# Patient Record
Sex: MALE | Race: OTHER | HISPANIC OR LATINO | URBAN - METROPOLITAN AREA
[De-identification: names, ages, dates, MRNs, and addresses within clinical notes are randomized per-mention and may not be internally consistent; named-entity substitution may affect disease eponyms.]

---

## 2020-04-01 ENCOUNTER — EMERGENCY (EMERGENCY)
Facility: HOSPITAL | Age: 45
LOS: 1 days | Discharge: HOME | End: 2020-04-01
Attending: EMERGENCY MEDICINE
Payer: MEDICAID

## 2020-04-01 VITALS
HEIGHT: 66 IN | RESPIRATION RATE: 17 BRPM | SYSTOLIC BLOOD PRESSURE: 153 MMHG | HEART RATE: 93 BPM | TEMPERATURE: 98 F | OXYGEN SATURATION: 99 % | DIASTOLIC BLOOD PRESSURE: 90 MMHG | WEIGHT: 145.06 LBS

## 2020-04-01 PROCEDURE — 99284 EMERGENCY DEPT VISIT MOD MDM: CPT

## 2020-04-01 PROCEDURE — 99283 EMERGENCY DEPT VISIT LOW MDM: CPT

## 2020-04-01 RX ORDER — ASPIRIN/CALCIUM CARB/MAGNESIUM 324 MG
1 TABLET ORAL
Qty: 0 | Refills: 0 | DISCHARGE

## 2020-04-01 RX ORDER — INSULIN GLARGINE 100 [IU]/ML
1 INJECTION, SOLUTION SUBCUTANEOUS
Qty: 15 | Refills: 0
Start: 2020-04-01 | End: 2020-04-30

## 2020-04-01 RX ORDER — ACETAMINOPHEN WITH CODEINE 300MG-30MG
1 TABLET ORAL
Qty: 12 | Refills: 0
Start: 2020-04-01 | End: 2020-04-03

## 2020-04-01 RX ORDER — LISINOPRIL 2.5 MG/1
1 TABLET ORAL
Qty: 30 | Refills: 0
Start: 2020-04-01 | End: 2020-04-30

## 2020-04-01 RX ORDER — ASPIRIN/CALCIUM CARB/MAGNESIUM 324 MG
1 TABLET ORAL
Qty: 30 | Refills: 0
Start: 2020-04-01 | End: 2020-04-30

## 2020-04-01 RX ORDER — ALBUTEROL 90 UG/1
2 AEROSOL, METERED ORAL
Qty: 1 | Refills: 0
Start: 2020-04-01 | End: 2020-04-07

## 2020-04-01 RX ORDER — LISINOPRIL 2.5 MG/1
1 TABLET ORAL
Qty: 0 | Refills: 0 | DISCHARGE

## 2020-04-01 RX ORDER — INSULIN GLARGINE 100 [IU]/ML
0 INJECTION, SOLUTION SUBCUTANEOUS
Qty: 0 | Refills: 0 | DISCHARGE

## 2020-04-01 NOTE — ED PROVIDER NOTE - DISCHARGE DATE
pw vomiting x today. no chronic medical history. non toxic appearing. vss. zofran ordered dom Em 01-Apr-2020

## 2020-04-01 NOTE — ED PROVIDER NOTE - PATIENT PORTAL LINK FT
You can access the FollowMyHealth Patient Portal offered by [facility] by registering at the following website: [email or web address]. By joining [employer]’s FollowMyHealth portal, you will also be able to view your health information using other applications (apps) compatible with our system.

## 2022-02-25 ENCOUNTER — INPATIENT (INPATIENT)
Facility: HOSPITAL | Age: 47
LOS: 1 days | DRG: 23 | End: 2022-02-27
Attending: NEUROLOGICAL SURGERY | Admitting: NEUROLOGICAL SURGERY
Payer: MEDICAID

## 2022-02-25 ENCOUNTER — EMERGENCY (EMERGENCY)
Facility: HOSPITAL | Age: 47
LOS: 1 days | Discharge: ANOTHER HOSPITAL | End: 2022-02-25
Attending: STUDENT IN AN ORGANIZED HEALTH CARE EDUCATION/TRAINING PROGRAM
Payer: MEDICAID

## 2022-02-25 VITALS
DIASTOLIC BLOOD PRESSURE: 73 MMHG | RESPIRATION RATE: 22 BRPM | HEART RATE: 107 BPM | SYSTOLIC BLOOD PRESSURE: 185 MMHG | TEMPERATURE: 100 F | OXYGEN SATURATION: 99 %

## 2022-02-25 VITALS
WEIGHT: 142.42 LBS | HEART RATE: 106 BPM | SYSTOLIC BLOOD PRESSURE: 184 MMHG | OXYGEN SATURATION: 99 % | HEIGHT: 66 IN | DIASTOLIC BLOOD PRESSURE: 87 MMHG | TEMPERATURE: 100 F | RESPIRATION RATE: 18 BRPM

## 2022-02-25 DIAGNOSIS — I61.9 NONTRAUMATIC INTRACEREBRAL HEMORRHAGE, UNSPECIFIED: ICD-10-CM

## 2022-02-25 PROBLEM — Z78.9 OTHER SPECIFIED HEALTH STATUS: Chronic | Status: ACTIVE | Noted: 2020-04-23

## 2022-02-25 LAB
A1C WITH ESTIMATED AVERAGE GLUCOSE RESULT: 10.4 % — HIGH (ref 4–5.6)
ALBUMIN SERPL ELPH-MCNC: 4 G/DL — SIGNIFICANT CHANGE UP (ref 3.5–5)
ALBUMIN SERPL ELPH-MCNC: 4.9 G/DL — SIGNIFICANT CHANGE UP (ref 3.3–5)
ALP SERPL-CCNC: 404 U/L — HIGH (ref 40–120)
ALP SERPL-CCNC: 405 U/L — HIGH (ref 40–120)
ALT FLD-CCNC: 147 U/L — HIGH (ref 10–45)
ALT FLD-CCNC: 184 U/L DA — HIGH (ref 10–60)
AMPHET UR-MCNC: NEGATIVE — SIGNIFICANT CHANGE UP
ANION GAP SERPL CALC-SCNC: 12 MMOL/L — SIGNIFICANT CHANGE UP (ref 5–17)
ANION GAP SERPL CALC-SCNC: 20 MMOL/L — HIGH (ref 5–17)
ANION GAP SERPL CALC-SCNC: 22 MMOL/L — HIGH (ref 5–17)
ANION GAP SERPL CALC-SCNC: 28 MMOL/L — HIGH (ref 5–17)
APAP SERPL-MCNC: <15 UG/ML — SIGNIFICANT CHANGE UP (ref 10–30)
APPEARANCE UR: CLEAR — SIGNIFICANT CHANGE UP
APTT BLD: 29.2 SEC — SIGNIFICANT CHANGE UP (ref 27.5–35.5)
APTT BLD: 31 SEC — SIGNIFICANT CHANGE UP (ref 27.5–35.5)
APTT BLD: 31.3 SEC — SIGNIFICANT CHANGE UP (ref 27.5–35.5)
AST SERPL-CCNC: 109 U/L — HIGH (ref 10–40)
AST SERPL-CCNC: 125 U/L — HIGH (ref 10–40)
B-OH-BUTYR SERPL-SCNC: 6.8 MMOL/L — HIGH
BACTERIA # UR AUTO: NEGATIVE — SIGNIFICANT CHANGE UP
BARBITURATES UR SCN-MCNC: NEGATIVE — SIGNIFICANT CHANGE UP
BASE EXCESS BLDV CALC-SCNC: -7.4 MMOL/L — SIGNIFICANT CHANGE UP
BASOPHILS # BLD AUTO: 0.04 K/UL — SIGNIFICANT CHANGE UP (ref 0–0.2)
BASOPHILS # BLD AUTO: 0.04 K/UL — SIGNIFICANT CHANGE UP (ref 0–0.2)
BASOPHILS NFR BLD AUTO: 0.2 % — SIGNIFICANT CHANGE UP (ref 0–2)
BASOPHILS NFR BLD AUTO: 0.3 % — SIGNIFICANT CHANGE UP (ref 0–2)
BENZODIAZ UR-MCNC: POSITIVE
BILIRUB SERPL-MCNC: 5.8 MG/DL — HIGH (ref 0.2–1.2)
BILIRUB SERPL-MCNC: 5.8 MG/DL — HIGH (ref 0.2–1.2)
BILIRUB UR-MCNC: NEGATIVE — SIGNIFICANT CHANGE UP
BLD GP AB SCN SERPL QL: NEGATIVE — SIGNIFICANT CHANGE UP
BLD GP AB SCN SERPL QL: SIGNIFICANT CHANGE UP
BUN SERPL-MCNC: 10 MG/DL — SIGNIFICANT CHANGE UP (ref 7–23)
BUN SERPL-MCNC: 11 MG/DL — SIGNIFICANT CHANGE UP (ref 7–18)
BUN SERPL-MCNC: 11 MG/DL — SIGNIFICANT CHANGE UP (ref 7–23)
BUN SERPL-MCNC: 12 MG/DL — SIGNIFICANT CHANGE UP (ref 7–23)
CALCIUM SERPL-MCNC: 10 MG/DL — SIGNIFICANT CHANGE UP (ref 8.4–10.5)
CALCIUM SERPL-MCNC: 8.3 MG/DL — LOW (ref 8.4–10.5)
CALCIUM SERPL-MCNC: 8.7 MG/DL — SIGNIFICANT CHANGE UP (ref 8.4–10.5)
CALCIUM SERPL-MCNC: 9.9 MG/DL — SIGNIFICANT CHANGE UP (ref 8.4–10.5)
CHLORIDE SERPL-SCNC: 107 MMOL/L — SIGNIFICANT CHANGE UP (ref 96–108)
CHLORIDE SERPL-SCNC: 121 MMOL/L — HIGH (ref 96–108)
CHLORIDE SERPL-SCNC: 95 MMOL/L — LOW (ref 96–108)
CHLORIDE SERPL-SCNC: 97 MMOL/L — SIGNIFICANT CHANGE UP (ref 96–108)
CHOLEST SERPL-MCNC: 200 MG/DL — HIGH
CO2 SERPL-SCNC: 14 MMOL/L — LOW (ref 22–31)
CO2 SERPL-SCNC: 17 MMOL/L — LOW (ref 22–31)
CO2 SERPL-SCNC: 17 MMOL/L — LOW (ref 22–31)
CO2 SERPL-SCNC: 20 MMOL/L — LOW (ref 22–31)
COCAINE METAB.OTHER UR-MCNC: NEGATIVE — SIGNIFICANT CHANGE UP
COLOR SPEC: SIGNIFICANT CHANGE UP
CREAT SERPL-MCNC: 0.35 MG/DL — LOW (ref 0.5–1.3)
CREAT SERPL-MCNC: 0.41 MG/DL — LOW (ref 0.5–1.3)
CREAT SERPL-MCNC: 0.44 MG/DL — LOW (ref 0.5–1.3)
CREAT SERPL-MCNC: 0.88 MG/DL — SIGNIFICANT CHANGE UP (ref 0.5–1.3)
DIFF PNL FLD: ABNORMAL
EOSINOPHIL # BLD AUTO: 0 K/UL — SIGNIFICANT CHANGE UP (ref 0–0.5)
EOSINOPHIL # BLD AUTO: 0 K/UL — SIGNIFICANT CHANGE UP (ref 0–0.5)
EOSINOPHIL NFR BLD AUTO: 0 % — SIGNIFICANT CHANGE UP (ref 0–6)
EOSINOPHIL NFR BLD AUTO: 0 % — SIGNIFICANT CHANGE UP (ref 0–6)
EPI CELLS # UR: 0 /HPF — SIGNIFICANT CHANGE UP
ESTIMATED AVERAGE GLUCOSE: 252 MG/DL — HIGH (ref 68–114)
ETHANOL SERPL-MCNC: SIGNIFICANT CHANGE UP MG/DL (ref 0–10)
GAS PNL BLDA: SIGNIFICANT CHANGE UP
GLUCOSE BLDC GLUCOMTR-MCNC: 215 MG/DL — HIGH (ref 70–99)
GLUCOSE BLDC GLUCOMTR-MCNC: 217 MG/DL — HIGH (ref 70–99)
GLUCOSE BLDC GLUCOMTR-MCNC: 219 MG/DL — HIGH (ref 70–99)
GLUCOSE BLDC GLUCOMTR-MCNC: 332 MG/DL — HIGH (ref 70–99)
GLUCOSE BLDC GLUCOMTR-MCNC: 387 MG/DL — HIGH (ref 70–99)
GLUCOSE BLDC GLUCOMTR-MCNC: 416 MG/DL — HIGH (ref 70–99)
GLUCOSE SERPL-MCNC: 225 MG/DL — HIGH (ref 70–99)
GLUCOSE SERPL-MCNC: 305 MG/DL — HIGH (ref 70–99)
GLUCOSE SERPL-MCNC: 380 MG/DL — HIGH (ref 70–99)
GLUCOSE SERPL-MCNC: 392 MG/DL — HIGH (ref 70–99)
GLUCOSE UR QL: ABNORMAL
HCO3 BLDV-SCNC: 17 MMOL/L — LOW (ref 22–29)
HCT VFR BLD CALC: 33.1 % — LOW (ref 39–50)
HCT VFR BLD CALC: 38.4 % — LOW (ref 39–50)
HCT VFR BLD CALC: 42.1 % — SIGNIFICANT CHANGE UP (ref 39–50)
HCT VFR BLD CALC: 43 % — SIGNIFICANT CHANGE UP (ref 39–50)
HDLC SERPL-MCNC: 66 MG/DL — SIGNIFICANT CHANGE UP
HGB BLD-MCNC: 11.4 G/DL — LOW (ref 13–17)
HGB BLD-MCNC: 13.5 G/DL — SIGNIFICANT CHANGE UP (ref 13–17)
HGB BLD-MCNC: 15.1 G/DL — SIGNIFICANT CHANGE UP (ref 13–17)
HGB BLD-MCNC: 15.1 G/DL — SIGNIFICANT CHANGE UP (ref 13–17)
HYALINE CASTS # UR AUTO: 1 /LPF — SIGNIFICANT CHANGE UP (ref 0–2)
IMM GRANULOCYTES NFR BLD AUTO: 0.6 % — SIGNIFICANT CHANGE UP (ref 0–1.5)
IMM GRANULOCYTES NFR BLD AUTO: 0.7 % — SIGNIFICANT CHANGE UP (ref 0–1.5)
INR BLD: 1.02 RATIO — SIGNIFICANT CHANGE UP (ref 0.88–1.16)
INR BLD: 1.02 RATIO — SIGNIFICANT CHANGE UP (ref 0.88–1.16)
INR BLD: 1.06 RATIO — SIGNIFICANT CHANGE UP (ref 0.88–1.16)
KETONES UR-MCNC: ABNORMAL
LEUKOCYTE ESTERASE UR-ACNC: NEGATIVE — SIGNIFICANT CHANGE UP
LIPID PNL WITH DIRECT LDL SERPL: 99 MG/DL — SIGNIFICANT CHANGE UP
LYMPHOCYTES # BLD AUTO: 0.46 K/UL — LOW (ref 1–3.3)
LYMPHOCYTES # BLD AUTO: 0.54 K/UL — LOW (ref 1–3.3)
LYMPHOCYTES # BLD AUTO: 3 % — LOW (ref 13–44)
LYMPHOCYTES # BLD AUTO: 3.3 % — LOW (ref 13–44)
MAGNESIUM SERPL-MCNC: 1.8 MG/DL — SIGNIFICANT CHANGE UP (ref 1.6–2.6)
MCHC RBC-ENTMCNC: 31.9 PG — SIGNIFICANT CHANGE UP (ref 27–34)
MCHC RBC-ENTMCNC: 31.9 PG — SIGNIFICANT CHANGE UP (ref 27–34)
MCHC RBC-ENTMCNC: 32 PG — SIGNIFICANT CHANGE UP (ref 27–34)
MCHC RBC-ENTMCNC: 32.5 PG — SIGNIFICANT CHANGE UP (ref 27–34)
MCHC RBC-ENTMCNC: 34.4 GM/DL — SIGNIFICANT CHANGE UP (ref 32–36)
MCHC RBC-ENTMCNC: 35.1 GM/DL — SIGNIFICANT CHANGE UP (ref 32–36)
MCHC RBC-ENTMCNC: 35.2 GM/DL — SIGNIFICANT CHANGE UP (ref 32–36)
MCHC RBC-ENTMCNC: 35.9 GM/DL — SIGNIFICANT CHANGE UP (ref 32–36)
MCV RBC AUTO: 90.7 FL — SIGNIFICANT CHANGE UP (ref 80–100)
MCV RBC AUTO: 90.7 FL — SIGNIFICANT CHANGE UP (ref 80–100)
MCV RBC AUTO: 90.8 FL — SIGNIFICANT CHANGE UP (ref 80–100)
MCV RBC AUTO: 93 FL — SIGNIFICANT CHANGE UP (ref 80–100)
METHADONE UR-MCNC: NEGATIVE — SIGNIFICANT CHANGE UP
MONOCYTES # BLD AUTO: 0.69 K/UL — SIGNIFICANT CHANGE UP (ref 0–0.9)
MONOCYTES # BLD AUTO: 0.8 K/UL — SIGNIFICANT CHANGE UP (ref 0–0.9)
MONOCYTES NFR BLD AUTO: 4.5 % — SIGNIFICANT CHANGE UP (ref 2–14)
MONOCYTES NFR BLD AUTO: 4.8 % — SIGNIFICANT CHANGE UP (ref 2–14)
NEUTROPHILS # BLD AUTO: 14.13 K/UL — HIGH (ref 1.8–7.4)
NEUTROPHILS # BLD AUTO: 15.01 K/UL — HIGH (ref 1.8–7.4)
NEUTROPHILS NFR BLD AUTO: 91 % — HIGH (ref 43–77)
NEUTROPHILS NFR BLD AUTO: 91.6 % — HIGH (ref 43–77)
NITRITE UR-MCNC: NEGATIVE — SIGNIFICANT CHANGE UP
NON HDL CHOLESTEROL: 134 MG/DL — HIGH
NRBC # BLD: 0 /100 WBCS — SIGNIFICANT CHANGE UP (ref 0–0)
OPIATES UR-MCNC: NEGATIVE — SIGNIFICANT CHANGE UP
OSMOLALITY SERPL: 324 MOSMOL/KG — HIGH (ref 275–300)
OSMOLALITY SERPL: 343 MOSMOL/KG — HIGH (ref 275–300)
OXYCODONE UR-MCNC: NEGATIVE — SIGNIFICANT CHANGE UP
PCO2 BLDV: 32 MMHG — LOW (ref 42–55)
PCP SPEC-MCNC: SIGNIFICANT CHANGE UP
PCP UR-MCNC: NEGATIVE — SIGNIFICANT CHANGE UP
PH BLDV: 7.34 — SIGNIFICANT CHANGE UP (ref 7.32–7.43)
PH UR: 6 — SIGNIFICANT CHANGE UP (ref 5–8)
PHOSPHATE SERPL-MCNC: 2.4 MG/DL — LOW (ref 2.5–4.5)
PLATELET # BLD AUTO: 140 K/UL — LOW (ref 150–400)
PLATELET # BLD AUTO: 141 K/UL — LOW (ref 150–400)
PLATELET # BLD AUTO: 159 K/UL — SIGNIFICANT CHANGE UP (ref 150–400)
PLATELET # BLD AUTO: 189 K/UL — SIGNIFICANT CHANGE UP (ref 150–400)
PO2 BLDV: 30 MMHG — SIGNIFICANT CHANGE UP
POTASSIUM SERPL-MCNC: 3.5 MMOL/L — SIGNIFICANT CHANGE UP (ref 3.5–5.3)
POTASSIUM SERPL-MCNC: 3.6 MMOL/L — SIGNIFICANT CHANGE UP (ref 3.5–5.3)
POTASSIUM SERPL-MCNC: 4.3 MMOL/L — SIGNIFICANT CHANGE UP (ref 3.5–5.3)
POTASSIUM SERPL-MCNC: 4.6 MMOL/L — SIGNIFICANT CHANGE UP (ref 3.5–5.3)
POTASSIUM SERPL-SCNC: 3.5 MMOL/L — SIGNIFICANT CHANGE UP (ref 3.5–5.3)
POTASSIUM SERPL-SCNC: 3.6 MMOL/L — SIGNIFICANT CHANGE UP (ref 3.5–5.3)
POTASSIUM SERPL-SCNC: 4.3 MMOL/L — SIGNIFICANT CHANGE UP (ref 3.5–5.3)
POTASSIUM SERPL-SCNC: 4.6 MMOL/L — SIGNIFICANT CHANGE UP (ref 3.5–5.3)
PROT SERPL-MCNC: 8.7 G/DL — HIGH (ref 6–8.3)
PROT SERPL-MCNC: 8.9 G/DL — HIGH (ref 6–8.3)
PROT UR-MCNC: ABNORMAL
PROTHROM AB SERPL-ACNC: 12.1 SEC — SIGNIFICANT CHANGE UP (ref 10.5–13.4)
PROTHROM AB SERPL-ACNC: 12.2 SEC — SIGNIFICANT CHANGE UP (ref 10.5–13.4)
PROTHROM AB SERPL-ACNC: 12.7 SEC — SIGNIFICANT CHANGE UP (ref 10.5–13.4)
RBC # BLD: 3.56 M/UL — LOW (ref 4.2–5.8)
RBC # BLD: 4.23 M/UL — SIGNIFICANT CHANGE UP (ref 4.2–5.8)
RBC # BLD: 4.64 M/UL — SIGNIFICANT CHANGE UP (ref 4.2–5.8)
RBC # BLD: 4.74 M/UL — SIGNIFICANT CHANGE UP (ref 4.2–5.8)
RBC # FLD: 13.5 % — SIGNIFICANT CHANGE UP (ref 10.3–14.5)
RBC # FLD: 13.6 % — SIGNIFICANT CHANGE UP (ref 10.3–14.5)
RBC # FLD: 13.7 % — SIGNIFICANT CHANGE UP (ref 10.3–14.5)
RBC # FLD: 13.8 % — SIGNIFICANT CHANGE UP (ref 10.3–14.5)
RBC CASTS # UR COMP ASSIST: 2 /HPF — SIGNIFICANT CHANGE UP (ref 0–4)
RH IG SCN BLD-IMP: POSITIVE — SIGNIFICANT CHANGE UP
SALICYLATES SERPL-MCNC: <2 MG/DL — LOW (ref 15–30)
SAO2 % BLDV: 52.1 % — SIGNIFICANT CHANGE UP
SARS-COV-2 RNA SPEC QL NAA+PROBE: SIGNIFICANT CHANGE UP
SODIUM SERPL-SCNC: 134 MMOL/L — LOW (ref 135–145)
SODIUM SERPL-SCNC: 137 MMOL/L — SIGNIFICANT CHANGE UP (ref 135–145)
SODIUM SERPL-SCNC: 146 MMOL/L — HIGH (ref 135–145)
SODIUM SERPL-SCNC: 153 MMOL/L — HIGH (ref 135–145)
SP GR SPEC: 1.04 — HIGH (ref 1.01–1.02)
T3 SERPL-MCNC: 79 NG/DL — LOW (ref 80–200)
T4 AB SER-ACNC: 9.4 UG/DL — SIGNIFICANT CHANGE UP (ref 4.6–12)
THC UR QL: NEGATIVE — SIGNIFICANT CHANGE UP
TRIGL SERPL-MCNC: 174 MG/DL — HIGH
TSH SERPL-MCNC: 0.59 UIU/ML — SIGNIFICANT CHANGE UP (ref 0.27–4.2)
UROBILINOGEN FLD QL: NEGATIVE — SIGNIFICANT CHANGE UP
WBC # BLD: 15.42 K/UL — HIGH (ref 3.8–10.5)
WBC # BLD: 16.5 K/UL — HIGH (ref 3.8–10.5)
WBC # BLD: 17.46 K/UL — HIGH (ref 3.8–10.5)
WBC # BLD: 17.96 K/UL — HIGH (ref 3.8–10.5)
WBC # FLD AUTO: 15.42 K/UL — HIGH (ref 3.8–10.5)
WBC # FLD AUTO: 16.5 K/UL — HIGH (ref 3.8–10.5)
WBC # FLD AUTO: 17.46 K/UL — HIGH (ref 3.8–10.5)
WBC # FLD AUTO: 17.96 K/UL — HIGH (ref 3.8–10.5)
WBC UR QL: 1 /HPF — SIGNIFICANT CHANGE UP (ref 0–5)

## 2022-02-25 PROCEDURE — 69990 MICROSURGERY ADD-ON: CPT

## 2022-02-25 PROCEDURE — 87635 SARS-COV-2 COVID-19 AMP PRB: CPT

## 2022-02-25 PROCEDURE — 85025 COMPLETE CBC W/AUTO DIFF WBC: CPT

## 2022-02-25 PROCEDURE — 82962 GLUCOSE BLOOD TEST: CPT

## 2022-02-25 PROCEDURE — 74177 CT ABD & PELVIS W/CONTRAST: CPT | Mod: 26

## 2022-02-25 PROCEDURE — 82803 BLOOD GASES ANY COMBINATION: CPT

## 2022-02-25 PROCEDURE — 93010 ELECTROCARDIOGRAM REPORT: CPT

## 2022-02-25 PROCEDURE — 71260 CT THORAX DX C+: CPT | Mod: 26,MA

## 2022-02-25 PROCEDURE — 86850 RBC ANTIBODY SCREEN: CPT

## 2022-02-25 PROCEDURE — 36556 INSERT NON-TUNNEL CV CATH: CPT

## 2022-02-25 PROCEDURE — 96374 THER/PROPH/DIAG INJ IV PUSH: CPT

## 2022-02-25 PROCEDURE — 86900 BLOOD TYPING SEROLOGIC ABO: CPT

## 2022-02-25 PROCEDURE — 70450 CT HEAD/BRAIN W/O DYE: CPT | Mod: 26,MA

## 2022-02-25 PROCEDURE — 36620 INSERTION CATHETER ARTERY: CPT

## 2022-02-25 PROCEDURE — 99291 CRITICAL CARE FIRST HOUR: CPT

## 2022-02-25 PROCEDURE — 93005 ELECTROCARDIOGRAM TRACING: CPT

## 2022-02-25 PROCEDURE — 99291 CRITICAL CARE FIRST HOUR: CPT | Mod: 25

## 2022-02-25 PROCEDURE — 61322 CRNEC/CRNOT DCMPRV W/O LOBEC: CPT

## 2022-02-25 PROCEDURE — 85610 PROTHROMBIN TIME: CPT

## 2022-02-25 PROCEDURE — 96375 TX/PRO/DX INJ NEW DRUG ADDON: CPT

## 2022-02-25 PROCEDURE — 99233 SBSQ HOSP IP/OBS HIGH 50: CPT

## 2022-02-25 PROCEDURE — 86901 BLOOD TYPING SEROLOGIC RH(D): CPT

## 2022-02-25 PROCEDURE — 71045 X-RAY EXAM CHEST 1 VIEW: CPT | Mod: 26

## 2022-02-25 PROCEDURE — 36415 COLL VENOUS BLD VENIPUNCTURE: CPT

## 2022-02-25 PROCEDURE — 85730 THROMBOPLASTIN TIME PARTIAL: CPT

## 2022-02-25 PROCEDURE — 70450 CT HEAD/BRAIN W/O DYE: CPT | Mod: MA

## 2022-02-25 PROCEDURE — 80053 COMPREHEN METABOLIC PANEL: CPT

## 2022-02-25 DEVICE — SURGIFOAM PAD 8CM X 12.5CM X 10MM (100): Type: IMPLANTABLE DEVICE | Site: LEFT | Status: FUNCTIONAL

## 2022-02-25 DEVICE — SURGICEL FIBRILLAR 2 X 4": Type: IMPLANTABLE DEVICE | Site: LEFT | Status: FUNCTIONAL

## 2022-02-25 DEVICE — SURGIFLO MATRIX WITH THROMBIN KIT: Type: IMPLANTABLE DEVICE | Site: LEFT | Status: FUNCTIONAL

## 2022-02-25 DEVICE — SURGICEL 2 X 14": Type: IMPLANTABLE DEVICE | Site: LEFT | Status: FUNCTIONAL

## 2022-02-25 DEVICE — MAYFIELD SKULL PIN ADULT PLASTIC: Type: IMPLANTABLE DEVICE | Site: LEFT | Status: FUNCTIONAL

## 2022-02-25 DEVICE — MATRIX DURAGEN PLUS DURAL REGENERATION 4X5IN: Type: IMPLANTABLE DEVICE | Site: LEFT | Status: FUNCTIONAL

## 2022-02-25 DEVICE — CATH VENT LG: Type: IMPLANTABLE DEVICE | Site: LEFT | Status: FUNCTIONAL

## 2022-02-25 RX ORDER — MIDAZOLAM HYDROCHLORIDE 1 MG/ML
5 INJECTION, SOLUTION INTRAMUSCULAR; INTRAVENOUS ONCE
Refills: 0 | Status: DISCONTINUED | OUTPATIENT
Start: 2022-02-25 | End: 2022-02-25

## 2022-02-25 RX ORDER — NOREPINEPHRINE BITARTRATE/D5W 8 MG/250ML
0.05 PLASTIC BAG, INJECTION (ML) INTRAVENOUS
Qty: 16 | Refills: 0 | Status: DISCONTINUED | OUTPATIENT
Start: 2022-02-25 | End: 2022-02-25

## 2022-02-25 RX ORDER — PIPERACILLIN AND TAZOBACTAM 4; .5 G/20ML; G/20ML
4.5 INJECTION, POWDER, LYOPHILIZED, FOR SOLUTION INTRAVENOUS ONCE
Refills: 0 | Status: COMPLETED | OUTPATIENT
Start: 2022-02-25 | End: 2022-02-25

## 2022-02-25 RX ORDER — MIDAZOLAM HYDROCHLORIDE 1 MG/ML
1 INJECTION, SOLUTION INTRAMUSCULAR; INTRAVENOUS ONCE
Refills: 0 | Status: DISCONTINUED | OUTPATIENT
Start: 2022-02-25 | End: 2022-02-25

## 2022-02-25 RX ORDER — THIAMINE MONONITRATE (VIT B1) 100 MG
100 TABLET ORAL DAILY
Refills: 0 | Status: DISCONTINUED | OUTPATIENT
Start: 2022-02-25 | End: 2022-02-25

## 2022-02-25 RX ORDER — CHLORHEXIDINE GLUCONATE 213 G/1000ML
1 SOLUTION TOPICAL
Refills: 0 | Status: DISCONTINUED | OUTPATIENT
Start: 2022-02-25 | End: 2022-02-25

## 2022-02-25 RX ORDER — ETOMIDATE 2 MG/ML
20 INJECTION INTRAVENOUS ONCE
Refills: 0 | Status: COMPLETED | OUTPATIENT
Start: 2022-02-25 | End: 2022-02-25

## 2022-02-25 RX ORDER — PHENOBARBITAL 60 MG
64.8 TABLET ORAL EVERY 8 HOURS
Refills: 0 | Status: DISCONTINUED | OUTPATIENT
Start: 2022-02-25 | End: 2022-02-26

## 2022-02-25 RX ORDER — DEXMEDETOMIDINE HYDROCHLORIDE IN 0.9% SODIUM CHLORIDE 4 UG/ML
0.3 INJECTION INTRAVENOUS
Qty: 200 | Refills: 0 | Status: DISCONTINUED | OUTPATIENT
Start: 2022-02-25 | End: 2022-02-25

## 2022-02-25 RX ORDER — NOREPINEPHRINE BITARTRATE/D5W 8 MG/250ML
0.3 PLASTIC BAG, INJECTION (ML) INTRAVENOUS
Qty: 8 | Refills: 0 | Status: DISCONTINUED | OUTPATIENT
Start: 2022-02-25 | End: 2022-02-25

## 2022-02-25 RX ORDER — CHLORHEXIDINE GLUCONATE 213 G/1000ML
15 SOLUTION TOPICAL EVERY 12 HOURS
Refills: 0 | Status: DISCONTINUED | OUTPATIENT
Start: 2022-02-25 | End: 2022-02-28

## 2022-02-25 RX ORDER — FENTANYL CITRATE 50 UG/ML
50 INJECTION INTRAVENOUS ONCE
Refills: 0 | Status: DISCONTINUED | OUTPATIENT
Start: 2022-02-25 | End: 2022-02-25

## 2022-02-25 RX ORDER — NAFCILLIN 10 G/100ML
3 INJECTION, POWDER, FOR SOLUTION INTRAVENOUS EVERY 6 HOURS
Refills: 0 | Status: DISCONTINUED | OUTPATIENT
Start: 2022-02-25 | End: 2022-02-26

## 2022-02-25 RX ORDER — SODIUM CHLORIDE 5 G/100ML
1000 INJECTION, SOLUTION INTRAVENOUS
Refills: 0 | Status: DISCONTINUED | OUTPATIENT
Start: 2022-02-25 | End: 2022-02-25

## 2022-02-25 RX ORDER — DEXTROSE 50 % IN WATER 50 %
50 SYRINGE (ML) INTRAVENOUS
Refills: 0 | Status: DISCONTINUED | OUTPATIENT
Start: 2022-02-25 | End: 2022-02-25

## 2022-02-25 RX ORDER — MIDAZOLAM HYDROCHLORIDE 1 MG/ML
0.1 INJECTION, SOLUTION INTRAMUSCULAR; INTRAVENOUS
Qty: 100 | Refills: 0 | Status: DISCONTINUED | OUTPATIENT
Start: 2022-02-25 | End: 2022-02-25

## 2022-02-25 RX ORDER — MIDAZOLAM HYDROCHLORIDE 1 MG/ML
2 INJECTION, SOLUTION INTRAMUSCULAR; INTRAVENOUS ONCE
Refills: 0 | Status: DISCONTINUED | OUTPATIENT
Start: 2022-02-25 | End: 2022-02-25

## 2022-02-25 RX ORDER — DEXTROSE 50 % IN WATER 50 %
50 SYRINGE (ML) INTRAVENOUS
Refills: 0 | Status: DISCONTINUED | OUTPATIENT
Start: 2022-02-25 | End: 2022-02-28

## 2022-02-25 RX ORDER — THIAMINE MONONITRATE (VIT B1) 100 MG
500 TABLET ORAL DAILY
Refills: 0 | Status: COMPLETED | OUTPATIENT
Start: 2022-02-25 | End: 2022-02-28

## 2022-02-25 RX ORDER — FENTANYL CITRATE 50 UG/ML
25 INJECTION INTRAVENOUS ONCE
Refills: 0 | Status: DISCONTINUED | OUTPATIENT
Start: 2022-02-25 | End: 2022-02-25

## 2022-02-25 RX ORDER — FOLIC ACID 0.8 MG
1 TABLET ORAL DAILY
Refills: 0 | Status: DISCONTINUED | OUTPATIENT
Start: 2022-02-25 | End: 2022-02-25

## 2022-02-25 RX ORDER — FENTANYL CITRATE 50 UG/ML
0.5 INJECTION INTRAVENOUS
Qty: 5000 | Refills: 0 | Status: DISCONTINUED | OUTPATIENT
Start: 2022-02-25 | End: 2022-02-25

## 2022-02-25 RX ORDER — PIPERACILLIN AND TAZOBACTAM 4; .5 G/20ML; G/20ML
4.5 INJECTION, POWDER, LYOPHILIZED, FOR SOLUTION INTRAVENOUS ONCE
Refills: 0 | Status: DISCONTINUED | OUTPATIENT
Start: 2022-02-25 | End: 2022-02-28

## 2022-02-25 RX ORDER — FENTANYL CITRATE 50 UG/ML
100 INJECTION INTRAVENOUS ONCE
Refills: 0 | Status: DISCONTINUED | OUTPATIENT
Start: 2022-02-25 | End: 2022-02-25

## 2022-02-25 RX ORDER — DESMOPRESSIN ACETATE 0.1 MG/1
20 TABLET ORAL ONCE
Refills: 0 | Status: DISCONTINUED | OUTPATIENT
Start: 2022-02-25 | End: 2022-02-25

## 2022-02-25 RX ORDER — LEVETIRACETAM 250 MG/1
1000 TABLET, FILM COATED ORAL ONCE
Refills: 0 | Status: COMPLETED | OUTPATIENT
Start: 2022-02-25 | End: 2022-02-25

## 2022-02-25 RX ORDER — CHLORHEXIDINE GLUCONATE 213 G/1000ML
15 SOLUTION TOPICAL EVERY 12 HOURS
Refills: 0 | Status: DISCONTINUED | OUTPATIENT
Start: 2022-02-25 | End: 2022-02-25

## 2022-02-25 RX ORDER — PHENOBARBITAL 60 MG
60 TABLET ORAL
Refills: 0 | Status: DISCONTINUED | OUTPATIENT
Start: 2022-02-25 | End: 2022-02-25

## 2022-02-25 RX ORDER — LEVETIRACETAM 250 MG/1
500 TABLET, FILM COATED ORAL EVERY 12 HOURS
Refills: 0 | Status: DISCONTINUED | OUTPATIENT
Start: 2022-02-25 | End: 2022-02-25

## 2022-02-25 RX ORDER — DESMOPRESSIN ACETATE 0.1 MG/1
20 TABLET ORAL ONCE
Refills: 0 | Status: COMPLETED | OUTPATIENT
Start: 2022-02-25 | End: 2022-02-25

## 2022-02-25 RX ORDER — SUCCINYLCHOLINE CHLORIDE 100 MG/5ML
100 SYRINGE (ML) INTRAVENOUS ONCE
Refills: 0 | Status: COMPLETED | OUTPATIENT
Start: 2022-02-25 | End: 2022-02-25

## 2022-02-25 RX ORDER — SODIUM CHLORIDE 9 MG/ML
1000 INJECTION, SOLUTION INTRAVENOUS
Refills: 0 | Status: DISCONTINUED | OUTPATIENT
Start: 2022-02-25 | End: 2022-02-26

## 2022-02-25 RX ORDER — LISINOPRIL 2.5 MG/1
10 TABLET ORAL DAILY
Refills: 0 | Status: DISCONTINUED | OUTPATIENT
Start: 2022-02-25 | End: 2022-02-25

## 2022-02-25 RX ORDER — INSULIN HUMAN 100 [IU]/ML
6 INJECTION, SOLUTION SUBCUTANEOUS
Qty: 100 | Refills: 0 | Status: DISCONTINUED | OUTPATIENT
Start: 2022-02-25 | End: 2022-02-25

## 2022-02-25 RX ORDER — PROPOFOL 10 MG/ML
20 INJECTION, EMULSION INTRAVENOUS
Qty: 1000 | Refills: 0 | Status: DISCONTINUED | OUTPATIENT
Start: 2022-02-25 | End: 2022-02-25

## 2022-02-25 RX ORDER — CALCIUM GLUCONATE 100 MG/ML
1 VIAL (ML) INTRAVENOUS ONCE
Refills: 0 | Status: DISCONTINUED | OUTPATIENT
Start: 2022-02-25 | End: 2022-02-25

## 2022-02-25 RX ORDER — SODIUM CHLORIDE 9 MG/ML
10 INJECTION INTRAMUSCULAR; INTRAVENOUS; SUBCUTANEOUS
Refills: 0 | Status: DISCONTINUED | OUTPATIENT
Start: 2022-02-25 | End: 2022-02-25

## 2022-02-25 RX ORDER — NOREPINEPHRINE BITARTRATE/D5W 8 MG/250ML
0.05 PLASTIC BAG, INJECTION (ML) INTRAVENOUS
Qty: 8 | Refills: 0 | Status: DISCONTINUED | OUTPATIENT
Start: 2022-02-25 | End: 2022-02-26

## 2022-02-25 RX ORDER — DEXTROSE 50 % IN WATER 50 %
25 SYRINGE (ML) INTRAVENOUS
Refills: 0 | Status: DISCONTINUED | OUTPATIENT
Start: 2022-02-25 | End: 2022-02-28

## 2022-02-25 RX ORDER — PANTOPRAZOLE SODIUM 20 MG/1
40 TABLET, DELAYED RELEASE ORAL DAILY
Refills: 0 | Status: DISCONTINUED | OUTPATIENT
Start: 2022-02-25 | End: 2022-02-27

## 2022-02-25 RX ORDER — VANCOMYCIN HCL 1 G
1000 VIAL (EA) INTRAVENOUS ONCE
Refills: 0 | Status: DISCONTINUED | OUTPATIENT
Start: 2022-02-25 | End: 2022-02-25

## 2022-02-25 RX ORDER — INSULIN HUMAN 100 [IU]/ML
6 INJECTION, SOLUTION SUBCUTANEOUS
Qty: 100 | Refills: 0 | Status: DISCONTINUED | OUTPATIENT
Start: 2022-02-25 | End: 2022-02-27

## 2022-02-25 RX ORDER — SODIUM CHLORIDE 9 MG/ML
1000 INJECTION INTRAMUSCULAR; INTRAVENOUS; SUBCUTANEOUS
Refills: 0 | Status: DISCONTINUED | OUTPATIENT
Start: 2022-02-25 | End: 2022-02-25

## 2022-02-25 RX ORDER — DEXTROSE 50 % IN WATER 50 %
25 SYRINGE (ML) INTRAVENOUS
Refills: 0 | Status: DISCONTINUED | OUTPATIENT
Start: 2022-02-25 | End: 2022-02-25

## 2022-02-25 RX ORDER — SODIUM CHLORIDE 9 MG/ML
1000 INJECTION INTRAMUSCULAR; INTRAVENOUS; SUBCUTANEOUS ONCE
Refills: 0 | Status: DISCONTINUED | OUTPATIENT
Start: 2022-02-25 | End: 2022-02-25

## 2022-02-25 RX ORDER — SODIUM CHLORIDE 9 MG/ML
30 INJECTION INTRAMUSCULAR; INTRAVENOUS; SUBCUTANEOUS ONCE
Refills: 0 | Status: DISCONTINUED | OUTPATIENT
Start: 2022-02-25 | End: 2022-02-25

## 2022-02-25 RX ORDER — PROPOFOL 10 MG/ML
20 INJECTION, EMULSION INTRAVENOUS
Qty: 500 | Refills: 0 | Status: DISCONTINUED | OUTPATIENT
Start: 2022-02-25 | End: 2022-02-26

## 2022-02-25 RX ORDER — MANNITOL
60 POWDER (GRAM) MISCELLANEOUS ONCE
Refills: 0 | Status: DISCONTINUED | OUTPATIENT
Start: 2022-02-25 | End: 2022-02-25

## 2022-02-25 RX ORDER — ACETAMINOPHEN 500 MG
1000 TABLET ORAL ONCE
Refills: 0 | Status: COMPLETED | OUTPATIENT
Start: 2022-02-25 | End: 2022-02-25

## 2022-02-25 RX ORDER — SODIUM CHLORIDE 9 MG/ML
1000 INJECTION INTRAMUSCULAR; INTRAVENOUS; SUBCUTANEOUS ONCE
Refills: 0 | Status: COMPLETED | OUTPATIENT
Start: 2022-02-25 | End: 2022-02-25

## 2022-02-25 RX ORDER — FOLIC ACID 0.8 MG
1 TABLET ORAL DAILY
Refills: 0 | Status: DISCONTINUED | OUTPATIENT
Start: 2022-02-25 | End: 2022-02-28

## 2022-02-25 RX ADMIN — PIPERACILLIN AND TAZOBACTAM 200 GRAM(S): 4; .5 INJECTION, POWDER, LYOPHILIZED, FOR SOLUTION INTRAVENOUS at 12:03

## 2022-02-25 RX ADMIN — FENTANYL CITRATE 50 MICROGRAM(S): 50 INJECTION INTRAVENOUS at 11:09

## 2022-02-25 RX ADMIN — FENTANYL CITRATE 50 MICROGRAM(S): 50 INJECTION INTRAVENOUS at 12:25

## 2022-02-25 RX ADMIN — ETOMIDATE 20 MILLIGRAM(S): 2 INJECTION INTRAVENOUS at 11:03

## 2022-02-25 RX ADMIN — Medication 100 MILLIGRAM(S): at 11:03

## 2022-02-25 RX ADMIN — MIDAZOLAM HYDROCHLORIDE 1 MILLIGRAM(S): 1 INJECTION, SOLUTION INTRAMUSCULAR; INTRAVENOUS at 11:09

## 2022-02-25 RX ADMIN — SODIUM CHLORIDE 1000 MILLILITER(S): 9 INJECTION INTRAMUSCULAR; INTRAVENOUS; SUBCUTANEOUS at 09:47

## 2022-02-25 RX ADMIN — MIDAZOLAM HYDROCHLORIDE 1 MILLIGRAM(S): 1 INJECTION, SOLUTION INTRAMUSCULAR; INTRAVENOUS at 11:16

## 2022-02-25 RX ADMIN — DEXMEDETOMIDINE HYDROCHLORIDE IN 0.9% SODIUM CHLORIDE 4.71 MICROGRAM(S)/KG/HR: 4 INJECTION INTRAVENOUS at 13:47

## 2022-02-25 RX ADMIN — MIDAZOLAM HYDROCHLORIDE 2 MILLIGRAM(S): 1 INJECTION, SOLUTION INTRAMUSCULAR; INTRAVENOUS at 09:48

## 2022-02-25 RX ADMIN — FENTANYL CITRATE 100 MICROGRAM(S): 50 INJECTION INTRAVENOUS at 11:25

## 2022-02-25 RX ADMIN — FENTANYL CITRATE 100 MICROGRAM(S): 50 INJECTION INTRAVENOUS at 11:40

## 2022-02-25 RX ADMIN — LEVETIRACETAM 400 MILLIGRAM(S): 250 TABLET, FILM COATED ORAL at 09:58

## 2022-02-25 RX ADMIN — Medication 400 MILLIGRAM(S): at 13:47

## 2022-02-25 RX ADMIN — FENTANYL CITRATE 50 MICROGRAM(S): 50 INJECTION INTRAVENOUS at 12:10

## 2022-02-25 RX ADMIN — Medication 1000 MILLIGRAM(S): at 14:17

## 2022-02-25 RX ADMIN — FENTANYL CITRATE 50 MICROGRAM(S): 50 INJECTION INTRAVENOUS at 11:25

## 2022-02-25 RX ADMIN — MIDAZOLAM HYDROCHLORIDE 2 MILLIGRAM(S): 1 INJECTION, SOLUTION INTRAMUSCULAR; INTRAVENOUS at 11:25

## 2022-02-25 RX ADMIN — DESMOPRESSIN ACETATE 220 MICROGRAM(S): 0.1 TABLET ORAL at 13:46

## 2022-02-25 NOTE — BRIEF OPERATIVE NOTE - NSICDXBRIEFPROCEDURE_GEN_ALL_CORE_FT
PROCEDURES:  Creation of suresh hole for intracranial pressure monitoring 25-Feb-2022 15:28:39  Jennifer Lundberg  Decompressive craniectomy 25-Feb-2022 21:12:24  Jennifer Lundberg

## 2022-02-25 NOTE — H&P ADULT - ASSESSMENT
Shilo Olson  46M, txfered from [facility], p/w AMS and vomiting since last night. Per daughter, patient has been drinking due to recent death of family member. Daughter did not see him fall, but has posterior head bump per [facility] chart notes. CT shows bilateral frontal and Left temporal contusions. Exam: Pupils 4R, FREDRICK, moans to nox, localizes, POWERS AG, no FC  -Adm NSCU w/ Dr. Witt  -4hr repeat CT  -Screen for BOOST and ASTRAL  -Plt 141, coags wnl  -Keppra 500BID  -GCS 9.  -Getting intubated by ED for decreasing GCS and agitation  -EVD

## 2022-02-25 NOTE — PROGRESS NOTE ADULT - SUBJECTIVE AND OBJECTIVE BOX
NSCU Progress Note    Assessment/Hospital Course:        24 Hour Events/Subjective:  -       REVIEW OF SYSTEMS:  - negative except as above    VITALS:   - T(C): 38.3 (02-25-22 @ 16:53), Max: 39 (02-25-22 @ 15:00)  T(F): 100 (02-25-22 @ 16:00), Max: 102.2 (02-25-22 @ 15:00)  HR: 101 (02-25-22 @ 16:53) (79 - 125)  BP: 161/91 (02-25-22 @ 16:53) (154/90 - 185/73)  ABP: 187/100 (02-25-22 @ 16:45) (114/60 - 197/93)  ABP(mean): 135 (02-25-22 @ 16:45) (81 - 135)  RR: 23 (02-25-22 @ 16:53) (16 - 35)  SpO2: 100% (02-25-22 @ 16:53) (99% - 100%)      IMAGING/DATA:   - Reviewed          PHYSICAL EXAM:    General: calm  CVS: RRR  Pulm: CTAB  GI: Soft, NTND  Extremities: No LE Edema  Neuro: AOx3, PERRL, EOMI, facial symmetrical, fluent speech, motor 5/5 throughout, no PND, sensation in tact   NSCU Progress Note    Assessment/Hospital Course:    46 year-old man with history of recent heavy EtOH use, hypertension, possible ASA use, hyperlipidemia and diabetes mellitus II was taken to [facility] 2/25/22 for vomiting since 2/24/22 PM and altered mental status. At OSH, his eyes opened to voice, but he was non-verbal and not following commands but was moving all limbs symmetrically and strongly. CT Head revealed bifrontal contusions, left temporal contusion, bifrontal subdural hematomas and traumatic subarachnoid hemorrhage. He was transferred to [facility] ED where his metal status declined further, resulting in intubation for airway protection. Repeat CT with increased hemorrhage.      24 Hour Events/Subjective:  - see event note for day events  - taken emergently to OR for refractory ICP crisis now s/p L hemicrani for decompression      REVIEW OF SYSTEMS:  - negative except as above    VITALS:   - T(C): 38.3 (02-25-22 @ 16:53), Max: 39 (02-25-22 @ 15:00)  T(F): 100 (02-25-22 @ 16:00), Max: 102.2 (02-25-22 @ 15:00)  HR: 101 (02-25-22 @ 16:53) (79 - 125)  BP: 161/91 (02-25-22 @ 16:53) (154/90 - 185/73)  ABP: 187/100 (02-25-22 @ 16:45) (114/60 - 197/93)  ABP(mean): 135 (02-25-22 @ 16:45) (81 - 135)  RR: 23 (02-25-22 @ 16:53) (16 - 35)  SpO2: 100% (02-25-22 @ 16:53) (99% - 100%)      IMAGING/DATA:   - Reviewed          PHYSICAL EXAM:    General: ett to vent  CVS: RRR  Pulm: CTAB  GI: Soft, NTND  Extremities: No LE Edema  Neuro:    NSCU Progress Note    Assessment/Hospital Course:    46 year-old man with history of recent heavy EtOH use, hypertension, possible ASA use, hyperlipidemia and diabetes mellitus II was taken to [facility] 2/25/22 for vomiting since 2/24/22 PM and altered mental status. At OSH, his eyes opened to voice, but he was non-verbal and not following commands but was moving all limbs symmetrically and strongly. CT Head revealed bifrontal contusions, left temporal contusion, bifrontal subdural hematomas and traumatic subarachnoid hemorrhage. He was transferred to [facility] ED where his metal status declined further, resulting in intubation for airway protection. Repeat CT with increased hemorrhage.      24 Hour Events/Subjective:  - see event note for day events  - taken emergently to OR for refractory ICP crisis now s/p L hemicrani for decompression      REVIEW OF SYSTEMS:  - negative except as above    VITALS:   - T(C): 38.3 (02-25-22 @ 16:53), Max: 39 (02-25-22 @ 15:00)  T(F): 100 (02-25-22 @ 16:00), Max: 102.2 (02-25-22 @ 15:00)  HR: 101 (02-25-22 @ 16:53) (79 - 125)  BP: 161/91 (02-25-22 @ 16:53) (154/90 - 185/73)  ABP: 187/100 (02-25-22 @ 16:45) (114/60 - 197/93)  ABP(mean): 135 (02-25-22 @ 16:45) (81 - 135)  RR: 23 (02-25-22 @ 16:53) (16 - 35)  SpO2: 100% (02-25-22 @ 16:53) (99% - 100%)      IMAGING/DATA:   - Reviewed          PHYSICAL EXAM:    General: ett to vent  CVS: RRR  Pulm: CTAB  GI: Soft, NTND  Extremities: No LE Edema  Neuro: pupils 3mm NR, +cough/gag, +overbreathing, no corneals, nothing all 4s   NSCU Progress Note    Assessment/Hospital Course:    46 year-old man with history of recent heavy EtOH use, hypertension, possible ASA use, hyperlipidemia and diabetes mellitus II was taken to [facility] 2/25/22 for vomiting since 2/24/22 PM and altered mental status. At OSH, his eyes opened to voice, but he was non-verbal and not following commands but was moving all limbs symmetrically and strongly. CT Head revealed bifrontal contusions, left temporal contusion, bifrontal subdural hematomas and traumatic subarachnoid hemorrhage. He was transferred to [facility] ED where his metal status declined further, resulting in intubation for airway protection. Repeat CT with increased hemorrhage.      24 Hour Events/Subjective:  - see event note for day events  - taken emergently to OR for refractory ICP crisis now s/p L hemicrani for decompression      REVIEW OF SYSTEMS:  - negative except as above    VITALS:   - T(C): 38.3 (02-25-22 @ 16:53), Max: 39 (02-25-22 @ 15:00)  T(F): 100 (02-25-22 @ 16:00), Max: 102.2 (02-25-22 @ 15:00)  HR: 101 (02-25-22 @ 16:53) (79 - 125)  BP: 161/91 (02-25-22 @ 16:53) (154/90 - 185/73)  ABP: 187/100 (02-25-22 @ 16:45) (114/60 - 197/93)  ABP(mean): 135 (02-25-22 @ 16:45) (81 - 135)  RR: 23 (02-25-22 @ 16:53) (16 - 35)  SpO2: 100% (02-25-22 @ 16:53) (99% - 100%)      IMAGING/DATA:   - Reviewed          PHYSICAL EXAM:    General: ett to vent  HENT: scleral icterus  CVS: RRR  Pulm: CTAB  GI: Soft, NTND  Extremities: No LE Edema  Neuro: pupils 3mm NR, +cough/gag, +overbreathing, no corneals, nothing all 4s   NSCU Progress Note    Assessment/Hospital Course:    46 year-old man with history of recent heavy EtOH use, hypertension, possible ASA use, hyperlipidemia and diabetes mellitus II was taken to [facility] 2/25/22 for vomiting since 2/24/22 PM and altered mental status. At OSH, his eyes opened to voice, but he was non-verbal and not following commands but was moving all limbs symmetrically and strongly. CT Head revealed bifrontal contusions, left temporal contusion, bifrontal subdural hematomas and traumatic subarachnoid hemorrhage. He was transferred to [facility] ED where his metal status declined further, resulting in intubation for airway protection. Repeat CT with increased hemorrhage.      24 Hour Events/Subjective:  - see event note for day events  - taken emergently to OR for refractory ICP crisis now s/p L hemicrani for decompression      REVIEW OF SYSTEMS:  - negative except as above    VITALS:   - T(C): 38.3 (02-25-22 @ 16:53), Max: 39 (02-25-22 @ 15:00)  T(F): 100 (02-25-22 @ 16:00), Max: 102.2 (02-25-22 @ 15:00)  HR: 101 (02-25-22 @ 16:53) (79 - 125)  BP: 161/91 (02-25-22 @ 16:53) (154/90 - 185/73)  ABP: 187/100 (02-25-22 @ 16:45) (114/60 - 197/93)  ABP(mean): 135 (02-25-22 @ 16:45) (81 - 135)  RR: 23 (02-25-22 @ 16:53) (16 - 35)  SpO2: 100% (02-25-22 @ 16:53) (99% - 100%)      IMAGING/DATA:   - Reviewed          PHYSICAL EXAM:    General: ett to vent  HENT: scleral icterus  CVS: RRR  Pulm: CTAB  GI: Soft, NTND  Extremities: No LE Edema  Neuro: pupils 3mm NR, +cough/gag, +overbreathing, no corneals, nothing uppers/lowers

## 2022-02-25 NOTE — CONSULT NOTE ADULT - SUBJECTIVE AND OBJECTIVE BOX
Level 1 Trauma Activation    46M on ASA81 transferred from [place] after presenting with AMS and emesis since last night. He had been using alcohol last night. No known trauma but presented with swelling on his posterior scalp. CT at  showed bilateral frontal and left temporal contusions.    Primary Survey  A - airway intact - intubated during trauma code at [facility]  B - bilateral breath sounds and good chest rise  C - initially BP: 155/102 (02-25-22 @ 12:25), palpable pulses in all extremities  D - GCS 9 on arrival  Exposure obtained      Secondary survey  General: Intubated  Neuro: Was moving all extremities prior to intubation  HEENT: Posterior scalp hematoma, no asymmetry, pupils 4mm bilaterally and reactive, no subconjunctival hemorrhage, no periorbital ecchymosis or swelling  Neck: Soft, supple, no crepitus  Cardio: RRR  Resp: Bilateral breath sounds  Thorax: No chest wall deformity  GI/Abd: Soft, nondistended, no bruising  Ext: Compartments soft, no lacerations or abrasions, no deformities        Labs:                        15.1   15.42 )-----------( 140      ( 25 Feb 2022 11:11 )             42.1     02-25    137  |  95<L>  |  11  ----------------------------<  392<H>  4.3   |  14<L>  |  0.41<L>    Ca    10.0      25 Feb 2022 11:11    TPro  8.7<H>  /  Alb  4.9  /  TBili  5.8<H>  /  DBili  x   /  AST  109<H>  /  ALT  147<H>  /  AlkPhos  404<H>  02-25    PT/INR - ( 25 Feb 2022 11:11 )   PT: 12.2 sec;   INR: 1.02 ratio         PTT - ( 25 Feb 2022 11:11 )  PTT:31.3 sec

## 2022-02-25 NOTE — PROCEDURE NOTE - NSPROCDETAILS_GEN_ALL_CORE
guidewire recovered/lumen(s) aspirated and flushed/sterile dressing applied/sterile technique, catheter placed
location identified, draped/prepped, sterile technique used, needle inserted/introduced/positive blood return obtained via catheter/connected to a pressurized flush line/sutured in place/hemostasis with direct pressure, dressing applied/Seldinger technique/all materials/supplies accounted for at end of procedure

## 2022-02-25 NOTE — ED PROVIDER NOTE - OBJECTIVE STATEMENT
45 y/o M with unknown PMH presents as a transfer from [facility] for ICH. Patient reportedly drinking last night and fell 45 y/o M with unknown PMH presents as a transfer from [facility] for ICH. Patient reportedly drinking alcohol last night and fell, was found to have large SAH and transferred to [facility] for further eval. Here patient unresponsive to questions. +hematoma to posterior scalp. Hx limited at this time.

## 2022-02-25 NOTE — ED PROVIDER NOTE - PHYSICAL EXAMINATION
GENERAL: unresponsive, not vocalizing  HEENT: hematoma to posterior scalp, moist mucous membranes, pupils 3+ reactive bilaterally  NECK: c-collar is in place  LUNGS: CTAB, no wheezes or crackles   CARDIAC: tachycardic, regular rhythm, no m/r/g  ABDOMEN: Soft, normal BS, non tender, non distended, no rebound, no guarding  BACK: No midline spinal tenderness, no CVA tenderness  EXT: No edema, no calf tenderness, 2+ DP pulses bilaterally, no deformities.  NEURO: purposeful movements, not vocalizing, does not follow commands  SKIN: Warm and dry. No rash.

## 2022-02-25 NOTE — CHART NOTE - NSCHARTNOTEFT_GEN_A_CORE
CAPRINI SCORE [CLOT] Score on Admission for     AGE RELATED RISK FACTORS                                                       MOBILITY RELATED FACTORS  [x ] Age 41-60 years                                            (1 Point)                  [x ] Bed rest                                                        (1 Point)  [ ] Age: 61-74 years                                           (2 Points)                 [ ] Plaster cast                                                   (2 Points)  [ ] Age= 75 years                                              (3 Points)                 [ ] Bed bound for more than 72 hours                 (2 Points)    DISEASE RELATED RISK FACTORS                                               GENDER SPECIFIC FACTORS  [ ] Edema in the lower extremities                       (1 Point)                  [ ] Pregnancy                                                     (1 Point)  [ ] Varicose veins                                               (1 Point)                  [ ] Post-partum < 6 weeks                                   (1 Point)             [ ] BMI > 25 Kg/m2                                            (1 Point)                  [ ] Hormonal therapy  or oral contraception          (1 Point)                 [ ] Sepsis (in the previous month)                        (1 Point)                  [ ] History of pregnancy complications                 (1 point)  [ ] Pneumonia or serious lung disease                                               [ ] Unexplained or recurrent                     (1 Point)           (in the previous month)                               (1 Point)  [ ] Abnormal pulmonary function test                     (1 Point)                 SURGERY RELATED RISK FACTORS (include planned surgeries)  [ ] Acute myocardial infarction                              (1 Point)                 [ ]  Section                                             (1 Point)  [ ] Congestive heart failure (in the previous month)  (1 Point)               [ ] Minor surgery                                                  (1 Point)   [ ] Inflammatory bowel disease                             (1 Point)                 [ ] Arthroscopic surgery                                        (2 Points)  [ ] Central venous access                                      (2 Points)                [x ] General surgery lasting more than 45 minutes   (2 Points)       [ ] Stroke (in the previous month)                          (5 Points)               [ ] Elective arthroplasty                                         (5 Points)            [ ] Current or past malignancy                                (2 Points)                                                                                                     HEMATOLOGY RELATED FACTORS                                                 TRAUMA RELATED RISK FACTORS  [ ] Prior episodes of VTE                                     (3 Points)                [ ] Fracture of the hip, pelvis, or leg                       (5 Points)  [ ] Positive family history for VTE                         (3 Points)                 [ ] Acute spinal cord injury (in the previous month)  (5 Points)  [ ] Prothrombin 80340 A                                     (3 Points)                 [ ] Paralysis  (less than 1 month)                             (5 Points)  [ ] Factor V Leiden                                             (3 Points)                  [ ] Multiple Trauma within 1 month                        (5 Points)  [ ] Lupus anticoagulants                                     (3 Points)                                                           [ ] Anticardiolipin antibodies                               (3 Points)                                                       [ ] High homocysteine in the blood                      (3 Points)                                             [ ] Other congenital or acquired thrombophilia      (3 Points)                                                [ ] Heparin induced thrombocytopenia                  (3 Points)                                          Total Score [      4    ]    Risk:  Very low 0   Low 1 to 2   Moderate 3 to 4   High =5       VTE Prophylasix Recommednations:  [ x] mechanical pneumatic compression devices                                      [ ] contraindicated: _____________________  [ ] chemo prophylasix                                                                                   [ ] contraindicated _____________________    **** HIGH LIKELIHOOD DVT PRESENT ON ADMISSION  [x ] (please order LE dopplers within 24 hours of admission)

## 2022-02-25 NOTE — PROGRESS NOTE ADULT - SUBJECTIVE AND OBJECTIVE BOX
SUMMARY:  46 year-old man with history of recent heavy EtOH use, hypertension, possible ASA use, hyperlipidemia and diabetes mellitus II was taken to [facility] 2/25/22 for vomiting since 2/24/22 PM and altered mental status. At OSH, his eyes opened to voice, but he was non-verbal and not following commands but was moving all limbs symmetrically and strongly. CT Head revealed bifrontal contusions, left temporal contusion, bifrontal subdural hematomas and traumatic subarachnoid hemorrhage. He was transferred to [facility] ED where his metal status declined further, resulting in intubation for airway protection. Repeat CT with increased hemorrhage.      HOSPITAL COURSE:  2/25: transferred to [facility] from [facility], intubated, a-line placed, central line placed, hyperglycemic placed on insulin gtt    24 HOUR EVENTS:  Admitted to NSCU. Right radial a-line placed. Left subclavian TLC placed. Hyperglycemic, placed on insulin gtt.     VITALS/DATA/ORDERS: [x] Reviewed    EXAMINATION:   General: Intubated  HEENT: Anicteric sclerae  Cardiac: [id number], mildly tachy  Lungs: Decreased breath sounds at bases  Abdomen: Soft, non-tender, +BS  Extremities: No c/c/e  Skin/Incision Site: Clean, dry and intact  Neurologic: Eyes closed, no opening to noxious, no commands, pupils 2mm sluggish, +corneals b/l, +cough/gag, localizes L>R arm, no movement in legs

## 2022-02-25 NOTE — ED PROVIDER NOTE - CLINICAL SUMMARY MEDICAL DECISION MAKING FREE TEXT BOX
46M with PMH including HTN, HLD, DM p/w AMS since last night. Per daughter the pt is normally A&Ox3. Last night became confused and started vomiting. Daughter states he has been drinking heavily over the past few days due to the death of his mother. Pt unable to provide further history 2/2 mental status. Noted to have bump to back of head, daughter denies witnessing any fall. Protecting airway, limited exam but no focal neuro findings. Concern for traumatic brain bleeding. Emergent CT and reassess.

## 2022-02-25 NOTE — PROGRESS NOTE ADULT - ASSESSMENT
ASSESSMENT/PLAN: Multifocal traumatic contusions, subdural hematomas and subarachnoid hemorrhage     NEURO:  EVD for ICP monitoring; ICP <20, CPP 60-70  Hypertonic saline with goal Na 145-155 for cerebral edema  PRN 23.4% versus mannitol for elevated ICPs  Head of bed at 30 degrees, head neutral, avoid compression of IJs given c-collar  Monitor for need for refractory ICPs and need for OR for decompression  Sedation: dexmedetomidine and PRN fentanyl; propofol if refractory ICPs  C-collar for now  Activity: [] mobilize as tolerated [x] Bedrest [] PT [] OT [] PMNR    PULM:  Vent support; VAP bundle  Aspiration precautions  CXR, ABG; wean FiO2 as tolerated    CV:  SBP goal 110mmHg-180mmHg; liberalize upper limit if difficulty in maintaining CPPs  TTE  EKG  Troponins    RENAL:  Fluids: hypertonic saline as above  Monitor I/Os    GI:  Diet: NPO for possible OR  GI prophylaxis [] not indicated [x] PPI [] other:  Bowel regimen; avoid constipation as will increased ICP    ENDO:   Goal euglycemia (-180)  Insulin gtt   Monitor for anion gag acidosis    HEME/ONC:  VTE prophylaxis: [x] SCDs [] chemoprophylaxis [x] hold chemoprophylaxis due to: fresh intracranial hemorrhage [x] high risk of DVT/PE on admission due to: trauma/immobility  ddAVP and platelets given ASA exposure    ID:  Febrile, presumed aspiration  Pip/tazo - tailor antibiotics as cultures/sensitivities available    SOCIAL/FAMILY:  [] awaiting [x] updated at bedside [] family meeting    CODE STATUS:  [x] Full Code [] DNR [] DNI [] Palliative/Comfort Care    DISPOSITION:  [x] ICU [] Stroke Unit [] Floor [] EMU [] RCU [] PCU    [x] Patient is at high risk of neurologic deterioration/death due to: cerebral edema/brain compression/herniation    Contact: [phone] ASSESSMENT/PLAN: Multifocal traumatic contusions, subdural hematomas and subarachnoid hemorrhage     NEURO:  EVD for ICP monitoring; ICP <20, CPP 60-70  Hypertonic saline with goal Na 145-155 for cerebral edema  PRN 23.4% versus mannitol for elevated ICPs  Head of bed at 30 degrees, head neutral, avoid compression of IJs given c-collar  Monitor for need for refractory ICPs and need for OR for decompression  Sedation: dexmedetomidine and PRN fentanyl; propofol if refractory ICPs  C-collar for now  Activity: [] mobilize as tolerated [x] Bedrest [] PT [] OT [] PMNR    PULM:  Vent support; VAP bundle  Aspiration precautions  CXR, ABG; wean FiO2 as tolerated    CV:  SBP goal 110mmHg-180mmHg; liberalize upper limit if difficulty in maintaining CPPs  TTE  EKG  Troponins    RENAL:  Fluids: hypertonic saline as above  Monitor I/Os    GI:  Diet: NPO for possible OR  GI prophylaxis [] not indicated [x] PPI [] other:  Bowel regimen; avoid constipation as will increased ICP    ENDO: DKA   Goal euglycemia (-180)  Insulin gtt   Monitor anion gap    HEME/ONC:  VTE prophylaxis: [x] SCDs [] chemoprophylaxis [x] hold chemoprophylaxis due to: fresh intracranial hemorrhage [x] high risk of DVT/PE on admission due to: trauma/immobility  ddAVP and platelets given ASA exposure    ID:  Febrile, presumed aspiration  Pip/tazo - tailor antibiotics as cultures/sensitivities available    SOCIAL/FAMILY:  [] awaiting [x] updated at bedside [] family meeting    CODE STATUS:  [x] Full Code [] DNR [] DNI [] Palliative/Comfort Care    DISPOSITION:  [x] ICU [] Stroke Unit [] Floor [] EMU [] RCU [] PCU    [x] Patient is at high risk of neurologic deterioration/death due to: cerebral edema/brain compression/herniation    Contact: [phone]

## 2022-02-25 NOTE — ED PROVIDER NOTE - PROGRESS NOTE DETAILS
Multiple hemorrhagic contusions vs. masses + SDH/SAH. Accepted by [facility] Dr. Whitmore requesting 1g of Keppra no BP control for now. EMS in route. Pt away protecting airway.

## 2022-02-25 NOTE — ED ADULT NURSE NOTE - NSIMPLEMENTINTERV_GEN_ALL_ED
Implemented All Fall with Harm Risk Interventions:  [place] to call system. Call bell, personal items and telephone within reach. Instruct patient to call for assistance. Room bathroom lighting operational. Non-slip footwear when patient is off stretcher. Physically safe environment: no spills, clutter or unnecessary equipment. Stretcher in lowest position, wheels locked, appropriate side rails in place. Provide visual cue, wrist band, yellow gown, etc. Monitor gait and stability. Monitor for mental status changes and reorient to person, place, and time. Review medications for side effects contributing to fall risk. Reinforce activity limits and safety measures with patient and family. Provide visual clues: red socks.

## 2022-02-25 NOTE — ED PROVIDER NOTE - CARE PLAN
1 Principal Discharge DX:	Traumatic subdural hematoma  Secondary Diagnosis:	Traumatic subarachnoid hemorrhage

## 2022-02-25 NOTE — PROCEDURE NOTE - NSICDXPROCEDURE_GEN_ALL_CORE_FT
PROCEDURES:  Creation of suresh hole for intracranial pressure monitoring 25-Feb-2022 15:28:39  Jennifer Lundberg

## 2022-02-25 NOTE — PROGRESS NOTE ADULT - SUBJECTIVE AND OBJECTIVE BOX
EVD technically difficult, so bolt placed instead.     ICPs 50-70s.     Given 23.4% and 60g mannitol. Placed on propofol sedation. Noted to be febrile, so placed on Arctic sun.    Continued hyperglycemia with acidosis/respiratory compensation. PCO2 19. Insulin gtt on-going. Given bicarbonate x 2 given slow correction. Tidal volume decreased.     Given persistent increased ICP, additional mannitol given. Propofol maxed. Fentanyl gtt maxed. Given 5mg Versed for additional sedation.      Pupils anisocoric. Right pupil 2mm, left pupil 4mm, both unreactive. NSGY resident alerted.     Started on norepinephrine gtt in attempt to optimize CPP given refractory ICP.     Portable CT coordinated.

## 2022-02-25 NOTE — ED PROVIDER NOTE - OBJECTIVE STATEMENT
46M with PMH including HTN, HLD, DM p/w AMS since last night. Per daughter the pt is normally A&Ox3. Last night became confused and started vomiting. Daughter states he has been drinking heavily over the past few days due to the death of his mother. Pt unable to provide further history 2/2 mental status. Noted to have bump to back of head, daughter denies witnessing any fall.

## 2022-02-25 NOTE — PROGRESS NOTE ADULT - ASSESSMENT
Refractory ICP from multifocal contusions, SDH and SAH  DKA    ICP:  Head of bed at 30 degrees, head neutral  Maximize sedation  Serum Osm, mannitol as able  Na 145-155 - recheck, on hypertonic saline, 23.4% as needed  CT Head to assess for increased heme   NSGY eval for OR    DKA:  Insulin gtt  Monitor anion gap  Hydration    Fever:  Arctic Sun  Shivering meds  Empiric antibiotics, broaden - add Vanco

## 2022-02-25 NOTE — ED ADULT NURSE NOTE - OBJECTIVE STATEMENT
Patient awake, alert, confused, non verbal, agitated. No shortness of breath noted. As per daughter, not acting normal.

## 2022-02-25 NOTE — PROVIDER CONTACT NOTE (CHANGE IN STATUS NOTIFICATION) - SITUATION
Patient was s/p [place] placement, ICP readings were in the 70s and patients L pupil was 3 R and blown and his R pupil was 2 R and fixed. Patient was also found to be in DKA

## 2022-02-25 NOTE — CONSULT NOTE ADULT - ASSESSMENT
46M on ASA81 transferred from [place] after presenting with AMS and emesis since last night with intracranial hemorrhage. Intubated in [facility] ED for declining GCS.    - CT head/c-spine, CTA neck, CT chest/abdomen/pelvis: no additional injuries identified (preliminary)  - Admitted to NSICU  - Will f/u offical imaging reads and follow for tertiary exam    Trauma Surgery  Pager [phone]

## 2022-02-25 NOTE — ED PROVIDER NOTE - ATTENDING CONTRIBUTION TO CARE
45 y/o m presents as transfer from [facility],by [facility] for ICH. Hx per EMS, patient drank alcohol last night was in usual state until this am, altered. Taken to hospital and found to have ICH. not on anticoagulation. started on Keppra and transferred here. En route GCS 10.  Primary Survey   A - airway intact  B - bilateral breath sounds and good chest rise  C -palpable pulses in all extremities  D - GCS Motor:  4/6, Verbal  2/5, eyes 1/4 total.   Exposure obtained    Secondary Survey:  Gen:  No respiratory Distress  /no distress from pain  HEENT: pupils 2 mm reactive to light equally,   EOMI  NO Raccoon Eyes/ Gaona Sign/ Neck: C- spine non tender. no step off or deformity. tm clear. scalp hematoma occiput small.   Lungs: breath sounds: b/l  CVS: S1S2,    Distal Pulses: 2+ B/L radial / DP  Abd: soft non tender no distention  Extremities: no edema or erythema. no tenderness.   MSK: spontaneously moving upper and lower ext.   Back: no midline tend or step off  Neuro: non verbal, responds to pain.

## 2022-02-25 NOTE — ED PROVIDER NOTE - CLINICAL SUMMARY MEDICAL DECISION MAKING FREE TEXT BOX
ATTG: : transfer for ICH concern for worsening bleeding. will check ct head, labs, check ct c spine, chest, abd pelvis,   During evaluation shorty after arrival, trauma team at bedside, converted from Level 2 to level 1 trauma activation. patient intubated for airway protected in projected course of worsening mental status and GCS now 9. fever 101.8 and lactate 5.0.

## 2022-02-25 NOTE — H&P ADULT - HISTORY OF PRESENT ILLNESS
46M, txfered from [facility], p/w AMS and vomiting since last night. Per daughter, patient has been drinking due to recent death of family member. Daughter did not see him fall, but has posterior head bump per [facility] chart notes. CT shows bilateral frontal and Left temporal contusions. Exam: Pupils 4R, FREDRICK, moans to nox, localizes, POWERS AG, no FC    ICU Vital Signs Last 24 Hrs  T(C): 37.7 (25 Feb 2022 10:48), Max: 37.7 (25 Feb 2022 10:48)  T(F): 99.8 (25 Feb 2022 10:48), Max: 99.8 (25 Feb 2022 10:48)  HR: 108 (25 Feb 2022 11:40) (107 - 108)  BP: 185/73 (25 Feb 2022 10:48) (185/73 - 185/73)  BP(mean): --  ABP: --  ABP(mean): --  RR: 22 (25 Feb 2022 10:48) (22 - 22)  SpO2: 100% (25 Feb 2022 11:40) (99% - 100%)

## 2022-02-25 NOTE — PROVIDER CONTACT NOTE (CHANGE IN STATUS NOTIFICATION) - ACTION/TREATMENT ORDERED:
Providers notified. 23.4% , mannitol, CT and sodium bicarb to be ordered and administered. Providers notified. 23.4% , mannitol, CT and sodium bicarb to be ordered and administered along with sedatives such as Propofol , fentanyl and versed.

## 2022-02-25 NOTE — PROGRESS NOTE ADULT - ASSESSMENT
NEURO:  s/p  23.4% and 60g mannitolx2, started on prop, versed, fentnayl on 2/25  - Neurochecks q1h  - ICP: HOB 30, midline, avoid IJ compression, ICP <20, CPP 60-70  - Cerebral Edema: HTS per below; PRN 23.4% versus mannitol for elevated ICPs  - Sedation: Propofol for refractory ICPs  - SZ PPX: EEG, burst suppression for refractory ICPs  - C-collar for now  - Etoh: Phenobrab 64mg q8h, CIWA, MVT, Thiamine IV [id number], IV [id number] then 100mg PO  - Central Fever/Shivering: Arctic Sun, tylenol  - Bedrest    PULM:  - Vent support; VAP bundle  - Aspiration precautions  - CXR  - ABG  - wean FiO2 as tolerated    CV:  - SBP goal 110mmHg-180mmHg; liberalize upper limit if difficulty in maintaining CPPs  - TTE  - EKG  - Troponins    RENAL:  - Fluids: HTS 3%@50  - Na 145-155   - 23.4% as needed  - Monitor I/Os  - Sosm    GI:  - Diet: NPO for possible OR  - GI prophylaxis PPI while intubated  - Bowel regimen; avoid constipation as will increased ICP    ENDO:   DKA   s/p Bicarb x2  - Goal euglycemia (-180)  - Insulin gtt   - Monitor anion gap  - VBG, BMPq4h; bridge to SC insulin when bicarb>18, gap closed, restarting feeds, ph 7.35-7.45    HEME/ONC:  ddAVP and platelets given ASA exposure  - VTE prophylaxis:SCDs  - hold chemoppx d/t fresh heme and post op  - TEG, ARU, PRU    ID:  Febrile, presumed aspiration  - Pip/tazo - tailor antibiotics as cultures/sensitivities available  - MRSA swab  - start vanc  - vanc trough  - PCT   Multifocal traumatic contusions, subdural hematomas and subarachnoid hemorrhage w/ refractory ICP crisis s/p emergent L decompressive hemicrani, now with L hemispheric stroke with no plans for further NSG intervention      NEURO:  s/p  23.4% and 60g mannitolx2, started on prop, versed, fentnayl on 2/25  CTH with L hemispheric stroke; no further NSG intervention  - Neurochecks q1h  - ICP: HOB 30, midline, avoid IJ compression, no titration of ICP per nsg  - EVD@0 no output, no ICP monitoring per nsg  - Cerebral Edema: HTS per below; PRN 23.4% versus mannitol for elevated ICPs  - Sedation: Propofol for refractory ICPs  - no eeg per nsg  - C-collar removed prior to OR by NSG  - Etoh: pehnobarb 64q8h; CIWA, MVT, Thiamine IV [id number] then 100mg PO  - Central Fever/Shivering: Arctic Sun PRN, tylenol  - per NSG discussion with family, poor prognosis; no further NSG intervention; ongoing goc with family    PULM:  - Vent support; VAP bundle  - CXR for ett confirmation after OR  - ABG  - wean FiO2 as tolerated    CV:  - SBP goal 110mmHg-180mmHg, no pressors per nsg  - will hold off on troponing  - ekg  - titrate uop >0.5cc/kg/hr    RENAL:  - Fluids: plasmalyte  - corrected>12meq in 24h; Osm>320, Na ~159 corrected  - Na 145-155   - monitor for DI  - Monitor I/Os    GI:  - Diet: NPO   - GI prophylaxis PPI while intubated  - Bowel regimen; avoid constipation as will increased ICP    ENDO:   DKA   s/p Bicarb x2  - Goal euglycemia (-180)  - Insulin gtt,   - ABG, BMPq6h; bridge to SC insulin when bicarb>18, gap closed, restarting feeds, ph 7.35-7.45    HEME/ONC:  ddAVP and platelets given ASA exposure  - VTE prophylaxis: SCDs  - hold chemoppx d/t fresh heme and post op  - TEG, ARU, PRU    ID:  Febrile, presumed aspiration  - Pip/tazo - tailor antibiotics as cultures/sensitivities available  - MRSA swab  - post-op nafcillin  - PCT   Multifocal traumatic contusions, subdural hematomas and subarachnoid hemorrhage w/ refractory ICP crisis s/p emergent L decompressive hemicrani, now with L hemispheric stroke with no plans for further NSG intervention      NEURO:  s/p  23.4% and 60g mannitolx2, started on prop, versed, fentnayl on 2/25  CTH with L hemispheric stroke; no further NSG intervention  - Neurochecks q1h  - ICP: HOB 30, midline, avoid IJ compression, no titration of ICP per nsg  - EVD@0 no output, no ICP monitoring per nsg  - Cerebral Edema: HTS per below; PRN 23.4% versus mannitol for elevated ICPs  - Sedation: Propofol for refractory ICPs  - no eeg per nsg  - C-collar removed prior to OR by NSG  - Etoh: pehnobarb 64q8h; CIWA, MVT, Thiamine IV [id number] then 100mg PO  - Central Fever/Shivering: Arctic Sun PRN, tylenol  - per NSG discussion with family, poor prognosis; no further NSG intervention; ongoing goc with family    PULM:  - Vent support; VAP bundle  - CXR for ett confirmation after OR  - ABG  - wean FiO2 as tolerated    CV:  - SBP goal 110mmHg-180mmHg, no pressors per nsg  - will hold off on troponing  - ekg  - titrate uop >0.5cc/kg/hr    RENAL:  - Fluids: plasmalyte  - corrected>12meq in 24h; Osm>320, Na ~159 corrected  - Na 145-155   - monitor for DI  - Monitor I/Os    GI:  - Diet: NPO   - GI prophylaxis PPI while intubated  - Bowel regimen; avoid constipation as will increased ICP  - CMP, lipid panel    ENDO:   DKA   s/p Bicarb x2  - Goal euglycemia (-180)  - Insulin gtt,   - ABG, BMPq6h; bridge to SC insulin when bicarb>18, gap closed, restarting feeds, ph 7.35-7.45    HEME/ONC:  ddAVP and platelets given ASA exposure  - VTE prophylaxis: SCDs  - hold chemoppx d/t fresh heme and post op  - TEG, ARU, PRU    ID:  Febrile, presumed aspiration  - Pip/tazo - tailor antibiotics as cultures/sensitivities available  - MRSA swab  - post-op nafcillin  - PCT   Multifocal traumatic contusions, subdural hematomas and subarachnoid hemorrhage w/ refractory ICP crisis s/p emergent L decompressive hemicrani, now with L hemispheric stroke with no plans for further NSG intervention      NEURO:  s/p  23.4% and 60g mannitolx2, started on prop, versed, fentnayl on 2/25  CTH with L hemispheric stroke; no further NSG intervention  - Neurochecks q1h  - ICP: HOB 30, midline, avoid IJ compression, no titration of ICP per nsg  - EVD@0 no output, no ICP monitoring per nsg  - Cerebral Edema: HTS per below; PRN 23.4% versus mannitol for elevated ICPs  - Sedation: Propofol for refractory ICPs; received pentobarbital during day, will defer eeg placement and pentobarbital after further discussion with NSG also without ICP monitoring, will keep sedated with propofol  - no eeg per nsg  - C-collar removed prior to OR by NSG  - Etoh: pehnobarb 64q8h; CIWA, MVT, Thiamine IV [id number] then 100mg PO  - Central Fever/Shivering: [place] Sun PRN, tylenol  - per NSG discussion with family, poor prognosis; no further NSG intervention; ongoing goc with family    PULM:  - Vent support; VAP bundle  - CXR for ett confirmation after OR  - ABG  - wean FiO2 as tolerated    CV:  - SBP goal 110mmHg-180mmHg, no pressors per nsg  - will hold off on troponing  - ekg  - titrate uop >0.5cc/kg/hr    RENAL:  - Fluids: plasmalyte  - corrected>12meq in 24h; Osm>320, Na ~159 corrected  - Na 145-155   - monitor for DI  - Monitor I/Os    GI:  - Diet: NPO   - GI prophylaxis PPI while intubated  - Bowel regimen; avoid constipation as will increased ICP  - CMP, lipid panel    ENDO:   DKA   s/p Bicarb x2  - Goal euglycemia (-180)  - Insulin gtt,   - ABG, BMPq6h; bridge to SC insulin when bicarb>18, gap closed, restarting feeds, ph 7.35-7.45    HEME/ONC:  ddAVP and platelets given ASA exposure  - VTE prophylaxis: SCDs  - hold chemoppx d/t fresh heme and post op  - TEG, ARU, PRU    ID:  Febrile, presumed aspiration  - Pip/tazo - tailor antibiotics as cultures/sensitivities available  - MRSA swab  - post-op nafcillin  - PCT

## 2022-02-25 NOTE — ED ADULT TRIAGE NOTE - CHIEF COMPLAINT QUOTE
C/o per daughter " he came home last night drunk around 11pm but acting normal. All night he was vomiting and at 5am started acting weird and not following what I was saying".

## 2022-02-26 LAB
ALBUMIN SERPL ELPH-MCNC: 3.5 G/DL — SIGNIFICANT CHANGE UP (ref 3.3–5)
ALP SERPL-CCNC: 256 U/L — HIGH (ref 40–120)
ALT FLD-CCNC: 88 U/L — HIGH (ref 10–45)
ANION GAP SERPL CALC-SCNC: 11 MMOL/L — SIGNIFICANT CHANGE UP (ref 5–17)
ANION GAP SERPL CALC-SCNC: 13 MMOL/L — SIGNIFICANT CHANGE UP (ref 5–17)
ANION GAP SERPL CALC-SCNC: 13 MMOL/L — SIGNIFICANT CHANGE UP (ref 5–17)
ANION GAP SERPL CALC-SCNC: 16 MMOL/L — SIGNIFICANT CHANGE UP (ref 5–17)
AST SERPL-CCNC: 85 U/L — HIGH (ref 10–40)
BILIRUB DIRECT SERPL-MCNC: 3.4 MG/DL — HIGH (ref 0–0.3)
BILIRUB INDIRECT FLD-MCNC: 2 MG/DL — HIGH (ref 0.2–1)
BILIRUB SERPL-MCNC: 5.4 MG/DL — HIGH (ref 0.2–1.2)
BUN SERPL-MCNC: 10 MG/DL — SIGNIFICANT CHANGE UP (ref 7–23)
BUN SERPL-MCNC: 7 MG/DL — SIGNIFICANT CHANGE UP (ref 7–23)
BUN SERPL-MCNC: 7 MG/DL — SIGNIFICANT CHANGE UP (ref 7–23)
BUN SERPL-MCNC: 8 MG/DL — SIGNIFICANT CHANGE UP (ref 7–23)
CALCIUM SERPL-MCNC: 8.3 MG/DL — LOW (ref 8.4–10.5)
CALCIUM SERPL-MCNC: 8.6 MG/DL — SIGNIFICANT CHANGE UP (ref 8.4–10.5)
CALCIUM SERPL-MCNC: 8.9 MG/DL — SIGNIFICANT CHANGE UP (ref 8.4–10.5)
CALCIUM SERPL-MCNC: 9.4 MG/DL — SIGNIFICANT CHANGE UP (ref 8.4–10.5)
CHLORIDE SERPL-SCNC: 119 MMOL/L — HIGH (ref 96–108)
CHLORIDE SERPL-SCNC: 121 MMOL/L — HIGH (ref 96–108)
CHLORIDE SERPL-SCNC: 122 MMOL/L — HIGH (ref 96–108)
CHLORIDE SERPL-SCNC: 127 MMOL/L — HIGH (ref 96–108)
CHOLEST SERPL-MCNC: 150 MG/DL — SIGNIFICANT CHANGE UP
CO2 SERPL-SCNC: 20 MMOL/L — LOW (ref 22–31)
CO2 SERPL-SCNC: 23 MMOL/L — SIGNIFICANT CHANGE UP (ref 22–31)
CREAT SERPL-MCNC: 0.34 MG/DL — LOW (ref 0.5–1.3)
CREAT SERPL-MCNC: 0.38 MG/DL — LOW (ref 0.5–1.3)
CREAT SERPL-MCNC: 0.38 MG/DL — LOW (ref 0.5–1.3)
CREAT SERPL-MCNC: 0.43 MG/DL — LOW (ref 0.5–1.3)
ETHANOL SERPL-MCNC: SIGNIFICANT CHANGE UP MG/DL (ref 0–10)
GAS PNL BLDA: SIGNIFICANT CHANGE UP
GLUCOSE BLDC GLUCOMTR-MCNC: 115 MG/DL — HIGH (ref 70–99)
GLUCOSE BLDC GLUCOMTR-MCNC: 118 MG/DL — HIGH (ref 70–99)
GLUCOSE BLDC GLUCOMTR-MCNC: 127 MG/DL — HIGH (ref 70–99)
GLUCOSE BLDC GLUCOMTR-MCNC: 130 MG/DL — HIGH (ref 70–99)
GLUCOSE BLDC GLUCOMTR-MCNC: 141 MG/DL — HIGH (ref 70–99)
GLUCOSE BLDC GLUCOMTR-MCNC: 145 MG/DL — HIGH (ref 70–99)
GLUCOSE BLDC GLUCOMTR-MCNC: 148 MG/DL — HIGH (ref 70–99)
GLUCOSE BLDC GLUCOMTR-MCNC: 162 MG/DL — HIGH (ref 70–99)
GLUCOSE BLDC GLUCOMTR-MCNC: 176 MG/DL — HIGH (ref 70–99)
GLUCOSE BLDC GLUCOMTR-MCNC: 180 MG/DL — HIGH (ref 70–99)
GLUCOSE BLDC GLUCOMTR-MCNC: 182 MG/DL — HIGH (ref 70–99)
GLUCOSE BLDC GLUCOMTR-MCNC: 183 MG/DL — HIGH (ref 70–99)
GLUCOSE BLDC GLUCOMTR-MCNC: 200 MG/DL — HIGH (ref 70–99)
GLUCOSE BLDC GLUCOMTR-MCNC: 202 MG/DL — HIGH (ref 70–99)
GLUCOSE BLDC GLUCOMTR-MCNC: 222 MG/DL — HIGH (ref 70–99)
GLUCOSE BLDC GLUCOMTR-MCNC: 227 MG/DL — HIGH (ref 70–99)
GLUCOSE BLDC GLUCOMTR-MCNC: 229 MG/DL — HIGH (ref 70–99)
GLUCOSE BLDC GLUCOMTR-MCNC: 237 MG/DL — HIGH (ref 70–99)
GLUCOSE BLDC GLUCOMTR-MCNC: 283 MG/DL — HIGH (ref 70–99)
GLUCOSE BLDC GLUCOMTR-MCNC: 294 MG/DL — HIGH (ref 70–99)
GLUCOSE BLDC GLUCOMTR-MCNC: 92 MG/DL — SIGNIFICANT CHANGE UP (ref 70–99)
GLUCOSE SERPL-MCNC: 153 MG/DL — HIGH (ref 70–99)
GLUCOSE SERPL-MCNC: 199 MG/DL — HIGH (ref 70–99)
GLUCOSE SERPL-MCNC: 223 MG/DL — HIGH (ref 70–99)
GLUCOSE SERPL-MCNC: 297 MG/DL — HIGH (ref 70–99)
HCT VFR BLD CALC: 32.3 % — LOW (ref 39–50)
HDLC SERPL-MCNC: 43 MG/DL — SIGNIFICANT CHANGE UP
HGB BLD-MCNC: 10.7 G/DL — LOW (ref 13–17)
LIPID PNL WITH DIRECT LDL SERPL: 78 MG/DL — SIGNIFICANT CHANGE UP
MAGNESIUM SERPL-MCNC: 1.9 MG/DL — SIGNIFICANT CHANGE UP (ref 1.6–2.6)
MAGNESIUM SERPL-MCNC: 2.2 MG/DL — SIGNIFICANT CHANGE UP (ref 1.6–2.6)
MAGNESIUM SERPL-MCNC: 2.4 MG/DL — SIGNIFICANT CHANGE UP (ref 1.6–2.6)
MAGNESIUM SERPL-MCNC: 3 MG/DL — HIGH (ref 1.6–2.6)
MCHC RBC-ENTMCNC: 32 PG — SIGNIFICANT CHANGE UP (ref 27–34)
MCHC RBC-ENTMCNC: 33.1 GM/DL — SIGNIFICANT CHANGE UP (ref 32–36)
MCV RBC AUTO: 96.7 FL — SIGNIFICANT CHANGE UP (ref 80–100)
MRSA PCR RESULT.: SIGNIFICANT CHANGE UP
NON HDL CHOLESTEROL: 106 MG/DL — SIGNIFICANT CHANGE UP
NRBC # BLD: 0 /100 WBCS — SIGNIFICANT CHANGE UP (ref 0–0)
OSMOLALITY SERPL: 316 MOSMOL/KG — HIGH (ref 275–300)
OSMOLALITY SERPL: 333 MOSMOL/KG — HIGH (ref 275–300)
OSMOLALITY SERPL: 335 MOSMOL/KG — HIGH (ref 275–300)
OSMOLALITY SERPL: 337 MOSMOL/KG — HIGH (ref 275–300)
PHOSPHATE SERPL-MCNC: 2.6 MG/DL — SIGNIFICANT CHANGE UP (ref 2.5–4.5)
PHOSPHATE SERPL-MCNC: 2.6 MG/DL — SIGNIFICANT CHANGE UP (ref 2.5–4.5)
PHOSPHATE SERPL-MCNC: 2.8 MG/DL — SIGNIFICANT CHANGE UP (ref 2.5–4.5)
PHOSPHATE SERPL-MCNC: 3.2 MG/DL — SIGNIFICANT CHANGE UP (ref 2.5–4.5)
PLATELET # BLD AUTO: 93 K/UL — LOW (ref 150–400)
POTASSIUM SERPL-MCNC: 3 MMOL/L — LOW (ref 3.5–5.3)
POTASSIUM SERPL-MCNC: 3.4 MMOL/L — LOW (ref 3.5–5.3)
POTASSIUM SERPL-MCNC: 3.6 MMOL/L — SIGNIFICANT CHANGE UP (ref 3.5–5.3)
POTASSIUM SERPL-MCNC: 4.5 MMOL/L — SIGNIFICANT CHANGE UP (ref 3.5–5.3)
POTASSIUM SERPL-SCNC: 3 MMOL/L — LOW (ref 3.5–5.3)
POTASSIUM SERPL-SCNC: 3.4 MMOL/L — LOW (ref 3.5–5.3)
POTASSIUM SERPL-SCNC: 3.6 MMOL/L — SIGNIFICANT CHANGE UP (ref 3.5–5.3)
POTASSIUM SERPL-SCNC: 4.5 MMOL/L — SIGNIFICANT CHANGE UP (ref 3.5–5.3)
PROT SERPL-MCNC: 6 G/DL — SIGNIFICANT CHANGE UP (ref 6–8.3)
RBC # BLD: 3.34 M/UL — LOW (ref 4.2–5.8)
RBC # FLD: 14.8 % — HIGH (ref 10.3–14.5)
S AUREUS DNA NOSE QL NAA+PROBE: DETECTED
SODIUM SERPL-SCNC: 153 MMOL/L — HIGH (ref 135–145)
SODIUM SERPL-SCNC: 157 MMOL/L — HIGH (ref 135–145)
SODIUM SERPL-SCNC: 158 MMOL/L — HIGH (ref 135–145)
SODIUM SERPL-SCNC: 163 MMOL/L — CRITICAL HIGH (ref 135–145)
SP GR UR STRIP: 1 — LOW (ref 1.01–1.02)
TRIGL SERPL-MCNC: 143 MG/DL — SIGNIFICANT CHANGE UP
WBC # BLD: 11.31 K/UL — HIGH (ref 3.8–10.5)
WBC # FLD AUTO: 11.31 K/UL — HIGH (ref 3.8–10.5)

## 2022-02-26 PROCEDURE — 99291 CRITICAL CARE FIRST HOUR: CPT

## 2022-02-26 RX ORDER — SODIUM,POTASSIUM PHOSPHATES 278-250MG
1 POWDER IN PACKET (EA) ORAL ONCE
Refills: 0 | Status: DISCONTINUED | OUTPATIENT
Start: 2022-02-26 | End: 2022-02-26

## 2022-02-26 RX ORDER — SODIUM CHLORIDE 9 MG/ML
1000 INJECTION, SOLUTION INTRAVENOUS
Refills: 0 | Status: DISCONTINUED | OUTPATIENT
Start: 2022-02-26 | End: 2022-02-26

## 2022-02-26 RX ORDER — POTASSIUM CHLORIDE 20 MEQ
40 PACKET (EA) ORAL EVERY 4 HOURS
Refills: 0 | Status: COMPLETED | OUTPATIENT
Start: 2022-02-26 | End: 2022-02-26

## 2022-02-26 RX ORDER — SODIUM CHLORIDE 9 MG/ML
1000 INJECTION, SOLUTION INTRAVENOUS ONCE
Refills: 0 | Status: COMPLETED | OUTPATIENT
Start: 2022-02-26 | End: 2022-02-26

## 2022-02-26 RX ORDER — NOREPINEPHRINE BITARTRATE/D5W 8 MG/250ML
0.05 PLASTIC BAG, INJECTION (ML) INTRAVENOUS
Qty: 8 | Refills: 0 | Status: DISCONTINUED | OUTPATIENT
Start: 2022-02-26 | End: 2022-02-27

## 2022-02-26 RX ORDER — VASOPRESSIN 20 [USP'U]/ML
0.04 INJECTION INTRAVENOUS
Qty: 50 | Refills: 0 | Status: DISCONTINUED | OUTPATIENT
Start: 2022-02-26 | End: 2022-03-01

## 2022-02-26 RX ORDER — PHENOBARBITAL 60 MG
64.8 TABLET ORAL EVERY 12 HOURS
Refills: 0 | Status: DISCONTINUED | OUTPATIENT
Start: 2022-02-26 | End: 2022-02-28

## 2022-02-26 RX ORDER — POTASSIUM CHLORIDE 20 MEQ
20 PACKET (EA) ORAL ONCE
Refills: 0 | Status: COMPLETED | OUTPATIENT
Start: 2022-02-26 | End: 2022-02-26

## 2022-02-26 RX ORDER — CALCIUM GLUCONATE 100 MG/ML
1 VIAL (ML) INTRAVENOUS ONCE
Refills: 0 | Status: COMPLETED | OUTPATIENT
Start: 2022-02-26 | End: 2022-02-26

## 2022-02-26 RX ORDER — MUPIROCIN 20 MG/G
1 OINTMENT TOPICAL EVERY 12 HOURS
Refills: 0 | Status: DISCONTINUED | OUTPATIENT
Start: 2022-02-26 | End: 2022-02-27

## 2022-02-26 RX ORDER — POTASSIUM PHOSPHATE, MONOBASIC POTASSIUM PHOSPHATE, DIBASIC 236; 224 MG/ML; MG/ML
15 INJECTION, SOLUTION INTRAVENOUS ONCE
Refills: 0 | Status: COMPLETED | OUTPATIENT
Start: 2022-02-26 | End: 2022-02-26

## 2022-02-26 RX ORDER — POTASSIUM CHLORIDE 20 MEQ
50 PACKET (EA) ORAL ONCE
Refills: 0 | Status: DISCONTINUED | OUTPATIENT
Start: 2022-02-26 | End: 2022-02-26

## 2022-02-26 RX ORDER — MAGNESIUM SULFATE 500 MG/ML
1 VIAL (ML) INJECTION ONCE
Refills: 0 | Status: COMPLETED | OUTPATIENT
Start: 2022-02-26 | End: 2022-02-26

## 2022-02-26 RX ORDER — SODIUM CHLORIDE 9 MG/ML
1000 INJECTION, SOLUTION INTRAVENOUS
Refills: 0 | Status: DISCONTINUED | OUTPATIENT
Start: 2022-02-26 | End: 2022-02-27

## 2022-02-26 RX ORDER — PHENYLEPHRINE HYDROCHLORIDE 10 MG/ML
0.3 INJECTION INTRAVENOUS
Qty: 40 | Refills: 0 | Status: DISCONTINUED | OUTPATIENT
Start: 2022-02-26 | End: 2022-02-26

## 2022-02-26 RX ORDER — POTASSIUM CHLORIDE 20 MEQ
40 PACKET (EA) ORAL ONCE
Refills: 0 | Status: DISCONTINUED | OUTPATIENT
Start: 2022-02-26 | End: 2022-02-26

## 2022-02-26 RX ORDER — BROMOCRIPTINE MESYLATE 5 MG/1
5 CAPSULE ORAL EVERY 8 HOURS
Refills: 0 | Status: DISCONTINUED | OUTPATIENT
Start: 2022-02-26 | End: 2022-02-28

## 2022-02-26 RX ORDER — MAGNESIUM SULFATE 500 MG/ML
2 VIAL (ML) INJECTION ONCE
Refills: 0 | Status: COMPLETED | OUTPATIENT
Start: 2022-02-26 | End: 2022-02-26

## 2022-02-26 RX ADMIN — POTASSIUM PHOSPHATE, MONOBASIC POTASSIUM PHOSPHATE, DIBASIC 62.5 MILLIMOLE(S): 236; 224 INJECTION, SOLUTION INTRAVENOUS at 23:48

## 2022-02-26 RX ADMIN — Medication 100 GRAM(S): at 06:37

## 2022-02-26 RX ADMIN — PANTOPRAZOLE SODIUM 40 MILLIGRAM(S): 20 TABLET, DELAYED RELEASE ORAL at 11:37

## 2022-02-26 RX ADMIN — Medication 100 GRAM(S): at 06:36

## 2022-02-26 RX ADMIN — BROMOCRIPTINE MESYLATE 5 MILLIGRAM(S): 5 CAPSULE ORAL at 14:59

## 2022-02-26 RX ADMIN — Medication 64.8 MILLIGRAM(S): at 18:12

## 2022-02-26 RX ADMIN — SODIUM CHLORIDE 2000 MILLILITER(S): 9 INJECTION, SOLUTION INTRAVENOUS at 05:00

## 2022-02-26 RX ADMIN — INSULIN HUMAN 6 UNIT(S)/HR: 100 INJECTION, SOLUTION SUBCUTANEOUS at 21:00

## 2022-02-26 RX ADMIN — NAFCILLIN 200 GRAM(S): 10 INJECTION, POWDER, FOR SOLUTION INTRAVENOUS at 06:11

## 2022-02-26 RX ADMIN — CHLORHEXIDINE GLUCONATE 15 MILLILITER(S): 213 SOLUTION TOPICAL at 05:06

## 2022-02-26 RX ADMIN — Medication 40 MILLIEQUIVALENT(S): at 06:37

## 2022-02-26 RX ADMIN — Medication 105 MILLIGRAM(S): at 11:23

## 2022-02-26 RX ADMIN — Medication 40 MILLIEQUIVALENT(S): at 09:57

## 2022-02-26 RX ADMIN — VASOPRESSIN 2.4 UNIT(S)/MIN: 20 INJECTION INTRAVENOUS at 19:43

## 2022-02-26 RX ADMIN — NAFCILLIN 200 GRAM(S): 10 INJECTION, POWDER, FOR SOLUTION INTRAVENOUS at 01:30

## 2022-02-26 RX ADMIN — Medication 1 MILLIGRAM(S): at 11:37

## 2022-02-26 RX ADMIN — Medication 5.89 MICROGRAM(S)/KG/MIN: at 19:44

## 2022-02-26 RX ADMIN — SODIUM CHLORIDE 75 MILLILITER(S): 9 INJECTION, SOLUTION INTRAVENOUS at 02:16

## 2022-02-26 RX ADMIN — INSULIN HUMAN 6 UNIT(S)/HR: 100 INJECTION, SOLUTION SUBCUTANEOUS at 02:15

## 2022-02-26 RX ADMIN — PROPOFOL 7.54 MICROGRAM(S)/KG/MIN: 10 INJECTION, EMULSION INTRAVENOUS at 02:15

## 2022-02-26 RX ADMIN — SODIUM CHLORIDE 75 MILLILITER(S): 9 INJECTION, SOLUTION INTRAVENOUS at 19:44

## 2022-02-26 RX ADMIN — Medication 100 MILLIEQUIVALENT(S): at 03:18

## 2022-02-26 RX ADMIN — INSULIN HUMAN 6 UNIT(S)/HR: 100 INJECTION, SOLUTION SUBCUTANEOUS at 11:23

## 2022-02-26 RX ADMIN — CHLORHEXIDINE GLUCONATE 15 MILLILITER(S): 213 SOLUTION TOPICAL at 18:12

## 2022-02-26 RX ADMIN — Medication 100 GRAM(S): at 03:17

## 2022-02-26 RX ADMIN — Medication 25 GRAM(S): at 03:17

## 2022-02-26 RX ADMIN — Medication 5.89 MICROGRAM(S)/KG/MIN: at 02:14

## 2022-02-26 RX ADMIN — Medication 1 TABLET(S): at 11:37

## 2022-02-26 RX ADMIN — PHENYLEPHRINE HYDROCHLORIDE 7.07 MICROGRAM(S)/KG/MIN: 10 INJECTION INTRAVENOUS at 05:07

## 2022-02-26 RX ADMIN — BROMOCRIPTINE MESYLATE 5 MILLIGRAM(S): 5 CAPSULE ORAL at 22:03

## 2022-02-26 RX ADMIN — PROPOFOL 7.54 MICROGRAM(S)/KG/MIN: 10 INJECTION, EMULSION INTRAVENOUS at 05:07

## 2022-02-26 RX ADMIN — Medication 64.8 MILLIGRAM(S): at 05:06

## 2022-02-26 RX ADMIN — POTASSIUM PHOSPHATE, MONOBASIC POTASSIUM PHOSPHATE, DIBASIC 62.5 MILLIMOLE(S): 236; 224 INJECTION, SOLUTION INTRAVENOUS at 03:18

## 2022-02-26 NOTE — PROVIDER CONTACT NOTE (OTHER) - SITUATION
Jaison demand increased Jaison demand increased suddenly with increased HR (115) and subsequent increased urine output of dilute, clear urine.

## 2022-02-26 NOTE — PROVIDER CONTACT NOTE (OTHER) - ASSESSMENT
EVD meniscus not apparent/air bubbles move slugglishly upon manipulation of EVD tubing. Increased HR, increased demetrice demand to maintain SBP >110.

## 2022-02-26 NOTE — PROGRESS NOTE ADULT - SUBJECTIVE AND OBJECTIVE BOX
46 year-old man with history of recent heavy EtOH use, hypertension, possible ASA use, hyperlipidemia and diabetes mellitus II was taken to [facility] 2/25/22 for vomiting since 2/24/22 PM and altered mental status. At OSH, his eyes opened to voice, but he was non-verbal and not following commands but was moving all limbs symmetrically and strongly. CT Head revealed bifrontal contusions, left temporal contusion, bifrontal subdural hematomas and traumatic subarachnoid hemorrhage. He was transferred to [facility] ED where his metal status declined further, resulting in intubation for airway protection. Repeat CT with increased hemorrhage.        24 HOUR EVENTS:   - taken emergently to OR for refractory ICP crisis now s/p L hemicrani for decompression w 400cc EBL        VITALS:  - Reviewed    LABS:  - Reviewed      MEDICATIONS:  - Reviewed      EXAMINATION:  General: in NAD  HEENT: MMM  Cards: RRR  Respiratory: no respiratory distress  Abdomen: soft  Extremities:  no LE edema  Neuro: pupils 3mm NR, no cough/gag, +overbreathing, no corneal no dolls nothing uppers TF om lowers

## 2022-02-26 NOTE — DISCHARGE NOTE NURSING/CASE MANAGEMENT/SOCIAL WORK - NSDCPEFALRISK_GEN_ALL_CORE
For information on Fall & Injury Prevention, visit: [email or web address] OR  [email or web address] OR  https://www.cdc.gov/steadi/patient.html

## 2022-02-26 NOTE — PROVIDER CONTACT NOTE (OTHER) - ACTION/TREATMENT ORDERED:
JUS Recinos and Obdulio agreed that it was not patent. Given the placement of the EVD seen in the CT head, no interventions at this time. EVD not patent.

## 2022-02-26 NOTE — PROGRESS NOTE ADULT - ASSESSMENT
Assessment/Plan:     Multifocal traumatic contusions, subdural hematomas and subarachnoid hemorrhage w/ refractory ICP crisis s/p emergent L decompressive hemicrani, now with L hemispheric stroke with no plans for further NSG intervention      NEURO:  s/p  23.4% and 60g mannitolx2, started on prop, versed, fentnayl on 2/25  CTH with L hemispheric stroke; no further NSG intervention  - Neurochecks q1h  - per NSG discussion with family, poor prognosis; no further NSG intervention; ongoing goc with family  - EVD@0 no output, no ICP monitoring per nsg and plan to poss remove tomorrow  - Sedation: minimize to not conflict w exam; possible brain death examination, off propofol and sedatives  - no eeg per nsg  - C-collar removed prior to OR by NSG  - Etoh/SZ: phenobarb 64q12h CIWA, MVT, Thiamine IV [id number] then 100mg PO  - Central Fever/Shivering: [place] Sun PRN, tylenol  - Bromocriptine 5q8h    PULM:  - Vent support; VAP bundle  - CXR ett in place  - ABG reviewed  - wean FiO2 as tolerated    CV:  MAP goal >65 for now wean pressors as tolerated currently on demetrice will switch to levo  - ekg and trop check  - titrate uop >0.5cc/kg/hr    RENAL:   DI  - Fluids: plasmalyte for now  - keep Na <160  - Thompson  - BMPq6  - Monitor I/Os    GI:  - Diet: NPO, OGT  - GI prophylaxis PPI while intubated  - Bowel regimen; avoid constipation as will increased ICP  - CMP, lipid panel    ENDO:   DKA , cont ins gtt for now  s/p Bicarb x2  - Goal euglycemia (-180)  - Insulin gtt,   - ABG, BMPq6h; bridge to SC insulin when bicarb>18, gap closed, restarting feeds, ph 7.35-7.45    HEME/ONC:  ddAVP and platelets given ASA exposure  - VTE prophylaxis: SCDs  - hold chemoppx d/t fresh heme and post op  - TEG, ARU, PRU    ID:  Febrile, presumed aspiration  - Pip/tazo - tailor antibiotics as cultures/sensitivities available on zosyn  - FU cultures  - MRSA swab  - post-op nafcillin will stop  - PCT      Will continue GOC w fam Assessment/Plan:     Multifocal traumatic contusions, subdural hematomas and subarachnoid hemorrhage w/ refractory ICP crisis s/p emergent L decompressive hemicrani, now with L hemispheric stroke with no plans for further NSG intervention      NEURO:  s/p  23.4% and 60g mannitolx2, started on prop, versed, fentnayl on 2/25  CTH with L hemispheric stroke; no further NSG intervention  - Neurochecks q1h  - per NSG discussion with family, poor prognosis; no further NSG intervention; ongoing goc with family  - EVD@0 no output, no ICP monitoring per nsg and plan to poss remove tomorrow  - Sedation: minimize to not conflict w exam; possible brain death examination, off propofol and sedatives  - no eeg per nsg  - C-collar removed prior to OR by NSG  - Etoh/SZ: phenobarb 64q12h CIWA, MVT, Thiamine IV [id number] then 100mg PO  - Central Fever/Shivering: Arctic Sun PRN, tylenol  - Bromocriptine 5q8h    PULM:  - Vent support; VAP bundle  - CXR ett in place  - ABG reviewed  - wean FiO2 as tolerated    CV:  MAP goal >65 for now wean pressors as tolerated currently on demetrice will switch to levo  - ekg and trop check  - titrate uop >0.5cc/kg/hr    RENAL:   DI  - Fluids: D5 75cc/h for now  - keep Na <160  - Thompson  - BMPq6  - Monitor I/Os    GI:  - Diet: NPO, OGT  - GI prophylaxis PPI while intubated  - Bowel regimen; avoid constipation as will increased ICP  - CMP, lipid panel    ENDO:   DKA , cont ins gtt for now  s/p Bicarb x2  - Goal euglycemia (-180)  - Insulin gtt,   - ABG, BMPq6h; bridge to SC insulin when bicarb>18, gap closed, restarting feeds, ph 7.35-7.45    HEME/ONC:  ddAVP and platelets given ASA exposure  - VTE prophylaxis: SCDs  - hold chemoppx d/t fresh heme and post op  - TEG, ARU, PRU    ID:  Febrile, presumed aspiration  - Pip/tazo - tailor antibiotics as cultures/sensitivities available on zosyn  - FU cultures  - MRSA swab  - post-op nafcillin will stop  - PCT      Will continue GOC w fam Assessment/Plan:     Multifocal traumatic contusions, subdural hematomas and subarachnoid hemorrhage w/ refractory ICP crisis s/p emergent L decompressive hemicrani, now with L hemispheric stroke with no plans for further NSG intervention      NEURO:  s/p  23.4% and 60g mannitolx2, started on prop, versed, fentnayl on 2/25  CTH with L hemispheric stroke; no further NSG intervention  - Neurochecks q4h  - per NSG discussion with family, poor prognosis; no further NSG intervention; ongoing goc with family  - EVD@0 no output, no ICP monitoring per nsg and plan to poss remove tomorrow  - Sedation: minimize to not conflict w exam; possible brain death examination, off propofol and sedatives  - no eeg per nsg  - C-collar removed prior to OR by NSG  - Etoh/SZ: phenobarb 64q12h CIWA, MVT, Thiamine IV [id number] then 100mg PO  - Central Fever/Shivering: Arctic Sun PRN, tylenol  - Bromocriptine 5q8h    PULM:  - Vent support; VAP bundle  - CXR ett in place  - ABG reviewed  - wean FiO2 as tolerated  - ABG am    CV:  - MAP goal >65 for now wean pressors as tolerated currently on demetrice will switch to levo  - titrate uop >0.5cc/kg/hr    RENAL:   DI  - Fluids: 1/2 NS 50cc/h  - keep Na <160  - Thompson  - BMPq6  - Monitor I/Os  - FWB 300q6h    GI:  - Diet: NPO, OGT  - GI prophylaxis PPI while intubated  - Bowel regimen; avoid constipation as will increased ICP  - CMP, lipid panel    ENDO:   DKA , cont ins gtt for now  s/p Bicarb x2  - Goal euglycemia (-180)  - Insulin gtt  - ABG, BMP    HEME/ONC:  ddAVP and platelets given ASA exposure  - VTE prophylaxis: SCDs  - hold chemoppx d/t fresh heme and post op  - TEG, ARU, PRU    ID:  Febrile, presumed aspiration  - Pip/tazo - tailor antibiotics as cultures/sensitivities available on zosyn  - FU cultures  - MRSA swab  - post-op nafcillin will stop  - PCT      Will continue GOC w fam Assessment/Plan:     Multifocal traumatic contusions, subdural hematomas and subarachnoid hemorrhage w/ refractory ICP crisis s/p emergent L decompressive hemicrani, now with L hemispheric stroke with no plans for further NSG intervention      NEURO:  s/p  23.4% and 60g mannitolx2, started on prop, versed, fentnayl on 2/25  CTH with L hemispheric stroke; no further NSG intervention  - Neurochecks q4h  - per NSG discussion with family, poor prognosis; no further NSG intervention; ongoing goc with family  - EVD@0 no output, no ICP monitoring per nsg and plan to poss remove tomorrow  - Sedation: minimize to not conflict w exam; possible brain death examination, off propofol and sedatives  - no eeg per nsg  - C-collar removed prior to OR by NSG  - Etoh/SZ: phenobarb 64q12h CIWA, MVT, Thiamine IV [id number] then 100mg PO  - Central Fever/Shivering: Arctic Sun PRN, tylenol  - Bromocriptine 5q8h    PULM:  - Vent support; VAP bundle  - CXR ett in place  - ABG reviewed  - wean FiO2 as tolerated  - ABG am    CV:  - MAP goal >65 for now wean pressors as tolerated currently on demetrice will switch to levo  - titrate uop >0.5cc/kg/hr    RENAL:   DI  - Fluids: D5 1/2 NS 50cc/h  - keep Na <160  - Thompson  - BMPq6  - Monitor I/Os  - FWB 300q6h    GI:  - Diet: NPO, OGT  - GI prophylaxis PPI while intubated  - Bowel regimen; avoid constipation as will increased ICP  - CMP, lipid panel    ENDO:   DKA , cont ins gtt for now  s/p Bicarb x2  - Goal euglycemia (-180)  - Insulin gtt  - ABG, BMP    HEME/ONC:  ddAVP and platelets given ASA exposure  - VTE prophylaxis: SCDs  - hold chemoppx d/t fresh heme and post op  - TEG, ARU, PRU    ID:  Febrile, presumed aspiration  - Pip/tazo - tailor antibiotics as cultures/sensitivities available on zosyn  - FU cultures  - MRSA swab  - post-op nafcillin will stop  - PCT      Will continue GOC w fam

## 2022-02-26 NOTE — DISCHARGE NOTE NURSING/CASE MANAGEMENT/SOCIAL WORK - PATIENT PORTAL LINK FT
You can access the FollowMyHealth Patient Portal offered by [facility] by registering at the following website: [email or web address]. By joining [facility]’s FollowMyHealth portal, you will also be able to view your health information using other applications (apps) compatible with our system.

## 2022-02-26 NOTE — PROGRESS NOTE ADULT - SUBJECTIVE AND OBJECTIVE BOX
Patient seen and examined at bedside.    --Anticoagulation--    T(C): 37 (02-26-22 @ 04:00), Max: 39 (02-25-22 @ 15:00)  HR: 92 (02-26-22 @ 04:00) (74 - 125)  BP: 161/91 (02-25-22 @ 16:53) (154/90 - 185/73)  RR: 14 (02-26-22 @ 04:00) (11 - 35)  SpO2: 100% (02-26-22 @ 04:00) (99% - 100%)  Wt(kg): --    Exam:  Intubated, no EO, Left pupil 4NR, Right 4NR,  trace cough gag overbreaths, no corneals, LUE trace extension, otherwise flaccid    Labs:                        11.4   17.46 )-----------( 159      ( 25 Feb 2022 22:35 )             33.1     02-26    153<H>  |  119<H>  |  8   ----------------------------<  199<H>  3.0<L>   |  23  |  0.34<L>    Ca    8.3<L>      26 Feb 2022 03:41  Phos  2.6     02-26  Mg     1.9     02-26    TPro  6.0  /  Alb  3.5  /  TBili  5.4<H>  /  DBili  3.4<H>  /  AST  85<H>  /  ALT  88<H>  /  AlkPhos  256<H>  02-25

## 2022-02-26 NOTE — PATIENT PROFILE ADULT - FUNCTIONAL ASSESSMENT - BASIC MOBILITY SECTION LABEL
----- Message from Felicity Middleton MD sent at 12/30/2021  9:10 PM CST -----  The Cologuard test to screen for colon cancer is negative.   .

## 2022-02-26 NOTE — PROVIDER CONTACT NOTE (OTHER) - ACTION/TREATMENT ORDERED:
JUS Recinos and Obdulio agreed that it was not patent. Given the placement of the EVD seen in the CT head, no interventions at this time. EVD not patent. JUS Matute recommended to continue to assess.  Draw serum os, urine spec, BMP, ABG. Continue to titrate demetrice.

## 2022-02-26 NOTE — PROGRESS NOTE ADULT - SUBJECTIVE AND OBJECTIVE BOX
46 year-old man with history of recent heavy EtOH use, hypertension, possible ASA use, hyperlipidemia and diabetes mellitus II was taken to [facility] 2/25/22 for vomiting since 2/24/22 PM and altered mental status. At OSH, his eyes opened to voice, but he was non-verbal and not following commands but was moving all limbs symmetrically and strongly. CT Head revealed bifrontal contusions, left temporal contusion, bifrontal subdural hematomas and traumatic subarachnoid hemorrhage. He was transferred to [facility] ED where his metal status declined further, resulting in intubation for airway protection. Repeat CT with increased hemorrhage.          EVENTS:   - taken emergently to OR for refractory ICP crisis now s/p L hemicrani for decompression w 400cc EBL    VITALS:  T(C): , Max: 39 (02-25-22 @ 15:00)  HR:  (74 - 125)  BP:  (154/90 - 185/73)  ABP:  (104/63 - 197/93)  RR:  (10 - 35)  SpO2:  (99% - 100%)  Wt(kg): --  Device: Avea, Mode: AC/ CMV (Assist Control/ Continuous Mandatory Ventilation), RR (machine): 14, TV (machine): 450, FiO2: 40, PEEP: 5, ITime: 1, MAP: 9, PIP: 17    02-25-22 @ 07:01  -  02-26-22 @ 07:00  --------------------------------------------------------  IN: 3392.5 mL / OUT: 2620 mL / NET: 772.5 mL      LABS:  Na: 153 (02-26 @ 03:41), 153 (02-25 @ 22:35), 146 (02-25 @ 16:40), 137 (02-25 @ 11:11)  K: 3.0 (02-26 @ 03:41), 3.5 (02-25 @ 22:35), 3.6 (02-25 @ 16:40), 4.3 (02-25 @ 11:11)  Cl: 119 (02-26 @ 03:41), 121 (02-25 @ 22:35), 107 (02-25 @ 16:40), 95 (02-25 @ 11:11)  CO2: 23 (02-26 @ 03:41), 20 (02-25 @ 22:35), 17 (02-25 @ 16:40), 14 (02-25 @ 11:11)  BUN: 8 (02-26 @ 03:41), 10 (02-25 @ 22:35), 12 (02-25 @ 16:40), 11 (02-25 @ 11:11)  Cr: 0.34 (02-26 @ 03:41), 0.35 (02-25 @ 22:35), 0.44 (02-25 @ 16:40), 0.41 (02-25 @ 11:11)  Glu: 199(02-26 @ 03:41), 225(02-25 @ 22:35), 305(02-25 @ 16:40), 392(02-25 @ 11:11)    Hgb: 11.4 (02-25 @ 22:35), 13.5 (02-25 @ 16:40), 15.1 (02-25 @ 11:11)  Hct: 33.1 (02-25 @ 22:35), 38.4 (02-25 @ 16:40), 42.1 (02-25 @ 11:11)  WBC: 17.46 (02-25 @ 22:35), 17.96 (02-25 @ 16:40), 15.42 (02-25 @ 11:11)  Plt: 159 (02-25 @ 22:35), 189 (02-25 @ 16:40), 140 (02-25 @ 11:11)    INR: 1.06 02-25-22 @ 16:40, 1.02 02-25-22 @ 11:11  PTT: 31.0 02-25-22 @ 16:40, 31.3 02-25-22 @ 11:11    MEDICATIONS:  chlorhexidine 0.12% Liquid 15 milliLiter(s) Oral Mucosa every 12 hours  dextrose 50% Injectable 50 milliLiter(s) IV Push every 15 minutes  dextrose 50% Injectable 25 milliLiter(s) IV Push every 15 minutes  folic acid 1 milliGRAM(s) Oral daily  insulin regular Infusion 6 Unit(s)/Hr IV Continuous <Continuous>  multiple electrolytes Injection Type 1 1000 milliLiter(s) IV Continuous <Continuous>  multivitamin 1 Tablet(s) Oral daily  nafcillin  IVPB 3 Gram(s) IV Intermittent every 6 hours  pantoprazole  Injectable 40 milliGRAM(s) IV Push daily  PHENobarbital 64.8 milliGRAM(s) Oral every 8 hours  phenylephrine    Infusion 0.3 MICROgram(s)/kG/Min IV Continuous <Continuous>  piperacillin/tazobactam IVPB.. 4.5 Gram(s) IV Intermittent once  potassium chloride   Solution 40 milliEquivalent(s) Oral every 4 hours  propofol Infusion 20 MICROgram(s)/kG/Min IV Continuous <Continuous>  thiamine IVPB 500 milliGRAM(s) IV Intermittent daily    EXAMINATION:  General:  in NAD  HEENT:  MMM  Neuro:   pupils 3mm NR, +cough/gag, +overbreathing, no corneals, nothing uppers/lowers  Cards:  RRR  Respiratory:  no respiratory distress  Abdomen:  soft  Extremities:  no LE edema    Assessment/Plan:     Multifocal traumatic contusions, subdural hematomas and subarachnoid hemorrhage w/ refractory ICP crisis s/p emergent L decompressive hemicrani, now with L hemispheric stroke with no plans for further NSG intervention      NEURO:  s/p  23.4% and 60g mannitolx2, started on prop, versed, fentnayl on 2/25  CTH with L hemispheric stroke; no further NSG intervention  - Neurochecks q2h  - per NSG discussion with family, poor prognosis; no further NSG intervention; ongoing goc with family  - EVD@0 no output, no ICP monitoring per nsg and plan to poss remove today  - Sedation: Propofol for refractory ICPs; received pentobarbital during day, will defer eeg placement and pentobarbital after further discussion with NSG also without ICP monitoring, will keep sedated with propofol  - no eeg per nsg  - C-collar removed prior to OR by NSG  - Etoh: pehnobarb 64q8h; CIWA, MVT, Thiamine IV [id number] then 100mg PO  - Central Fever/Shivering: Arctic Sun PRN, tylenol      PULM:  - Vent support; VAP bundle  - CXR for ett confirmation after OR  - ABG  - wean FiO2 as tolerated    CV:  - SBP goal 110mmHg-180mmHg, no pressors per nsg  - will hold off on troponing  - ekg  - titrate uop >0.5cc/kg/hr    RENAL:  - Fluids: plasmalyte  - corrected>12meq in 24h; Osm>320, Na ~159 corrected  - Na 145-155   - monitor for DI  - Monitor I/Os    GI:  - Diet: NPO   - GI prophylaxis PPI while intubated  - Bowel regimen; avoid constipation as will increased ICP  - CMP, lipid panel    ENDO:   DKA   s/p Bicarb x2  - Goal euglycemia (-180)  - Insulin gtt,   - ABG, BMPq6h; bridge to SC insulin when bicarb>18, gap closed, restarting feeds, ph 7.35-7.45    HEME/ONC:  ddAVP and platelets given ASA exposure  - VTE prophylaxis: SCDs  - hold chemoppx d/t fresh heme and post op  - TEG, ARU, PRU    ID:  Febrile, presumed aspiration  - Pip/tazo - tailor antibiotics as cultures/sensitivities available  - MRSA swab  - post-op nafcillin  - PCT     46 year-old man with history of recent heavy EtOH use, hypertension, possible ASA use, hyperlipidemia and diabetes mellitus II was taken to [facility] 2/25/22 for vomiting since 2/24/22 PM and altered mental status. At OSH, his eyes opened to voice, but he was non-verbal and not following commands but was moving all limbs symmetrically and strongly. CT Head revealed bifrontal contusions, left temporal contusion, bifrontal subdural hematomas and traumatic subarachnoid hemorrhage. He was transferred to [facility] ED where his metal status declined further, resulting in intubation for airway protection. Repeat CT with increased hemorrhage.          EVENTS:   - taken emergently to OR for refractory ICP crisis now s/p L hemicrani for decompression w 400cc EBL    VITALS:  T(C): , Max: 39 (02-25-22 @ 15:00)  HR:  (74 - 125)  BP:  (154/90 - 185/73)  ABP:  (104/63 - 197/93)  RR:  (10 - 35)  SpO2:  (99% - 100%)  Wt(kg): --  Device: Avea, Mode: AC/ CMV (Assist Control/ Continuous Mandatory Ventilation), RR (machine): 14, TV (machine): 450, FiO2: 40, PEEP: 5, ITime: 1, MAP: 9, PIP: 17    02-25-22 @ 07:01  -  02-26-22 @ 07:00  --------------------------------------------------------  IN: 3392.5 mL / OUT: 2620 mL / NET: 772.5 mL      LABS:  Na: 153 (02-26 @ 03:41), 153 (02-25 @ 22:35), 146 (02-25 @ 16:40), 137 (02-25 @ 11:11)  K: 3.0 (02-26 @ 03:41), 3.5 (02-25 @ 22:35), 3.6 (02-25 @ 16:40), 4.3 (02-25 @ 11:11)  Cl: 119 (02-26 @ 03:41), 121 (02-25 @ 22:35), 107 (02-25 @ 16:40), 95 (02-25 @ 11:11)  CO2: 23 (02-26 @ 03:41), 20 (02-25 @ 22:35), 17 (02-25 @ 16:40), 14 (02-25 @ 11:11)  BUN: 8 (02-26 @ 03:41), 10 (02-25 @ 22:35), 12 (02-25 @ 16:40), 11 (02-25 @ 11:11)  Cr: 0.34 (02-26 @ 03:41), 0.35 (02-25 @ 22:35), 0.44 (02-25 @ 16:40), 0.41 (02-25 @ 11:11)  Glu: 199(02-26 @ 03:41), 225(02-25 @ 22:35), 305(02-25 @ 16:40), 392(02-25 @ 11:11)    Hgb: 11.4 (02-25 @ 22:35), 13.5 (02-25 @ 16:40), 15.1 (02-25 @ 11:11)  Hct: 33.1 (02-25 @ 22:35), 38.4 (02-25 @ 16:40), 42.1 (02-25 @ 11:11)  WBC: 17.46 (02-25 @ 22:35), 17.96 (02-25 @ 16:40), 15.42 (02-25 @ 11:11)  Plt: 159 (02-25 @ 22:35), 189 (02-25 @ 16:40), 140 (02-25 @ 11:11)    INR: 1.06 02-25-22 @ 16:40, 1.02 02-25-22 @ 11:11  PTT: 31.0 02-25-22 @ 16:40, 31.3 02-25-22 @ 11:11    MEDICATIONS:  chlorhexidine 0.12% Liquid 15 milliLiter(s) Oral Mucosa every 12 hours  dextrose 50% Injectable 50 milliLiter(s) IV Push every 15 minutes  dextrose 50% Injectable 25 milliLiter(s) IV Push every 15 minutes  folic acid 1 milliGRAM(s) Oral daily  insulin regular Infusion 6 Unit(s)/Hr IV Continuous <Continuous>  multiple electrolytes Injection Type 1 1000 milliLiter(s) IV Continuous <Continuous>  multivitamin 1 Tablet(s) Oral daily  nafcillin  IVPB 3 Gram(s) IV Intermittent every 6 hours  pantoprazole  Injectable 40 milliGRAM(s) IV Push daily  PHENobarbital 64.8 milliGRAM(s) Oral every 8 hours  phenylephrine    Infusion 0.3 MICROgram(s)/kG/Min IV Continuous <Continuous>  piperacillin/tazobactam IVPB.. 4.5 Gram(s) IV Intermittent once  potassium chloride   Solution 40 milliEquivalent(s) Oral every 4 hours  propofol Infusion 20 MICROgram(s)/kG/Min IV Continuous <Continuous>  thiamine IVPB 500 milliGRAM(s) IV Intermittent daily    EXAMINATION:  General:  in NAD  HEENT:  MMM  Neuro:   pupils 3mm NR, no cough/gag, +overbreathing, no corneals, no dolls nothing uppers TF om lowers  Cards:  RRR  Respiratory:  no respiratory distress  Abdomen:  soft  Extremities:  no LE edema    Assessment/Plan:     Multifocal traumatic contusions, subdural hematomas and subarachnoid hemorrhage w/ refractory ICP crisis s/p emergent L decompressive hemicrani, now with L hemispheric stroke with no plans for further NSG intervention      NEURO:  s/p  23.4% and 60g mannitolx2, started on prop, versed, fentnayl on 2/25  CTH with L hemispheric stroke; no further NSG intervention  - Neurochecks q1h  - per NSG discussion with family, poor prognosis; no further NSG intervention; ongoing goc with family  - EVD@0 no output, no ICP monitoring per nsg and plan to poss remove today  - Sedation: minimize to not conflict w exam   - no eeg per nsg  - C-collar removed prior to OR by NSG  - Etoh: phenobarb 64q12h CIWA, MVT, Thiamine IV [id number] then 100mg PO  - Central Fever/Shivering: Arctic Sun PRN, tylenol  Bromocriptine 5q8h      PULM:  - Vent support; VAP bundle  - CXR ett in place  - ABG reviewed  - wean FiO2 as tolerated    CV:  MAP goal >65 for now wean pressors as tolerated currently on demetrice will switch to levo  - ekg and trop check  - titrate uop >0.5cc/kg/hr    RENAL: going into DI checking spec grav s OSM, if labs positive start vasopresin gtt  - Fluids: plasmalyte for now  keep Na <160  Thompson  BMPq6  - Monitor I/Os    GI:  - Diet: NPO, OGT  - GI prophylaxis PPI while intubated  - Bowel regimen; avoid constipation as will increased ICP  - CMP, lipid panel    ENDO:   DKA , cont ins gtt for now  s/p Bicarb x2  - Goal euglycemia (-180)  - Insulin gtt,   - ABG, BMPq6h; bridge to SC insulin when bicarb>18, gap closed, restarting feeds, ph 7.35-7.45    HEME/ONC:  ddAVP and platelets given ASA exposure  - VTE prophylaxis: SCDs  - hold chemoppx d/t fresh heme and post op  - TEG, ARU, PRU    ID:  Febrile, presumed aspiration  - Pip/tazo - tailor antibiotics as cultures/sensitivities available on zosyn  FU cultures  - MRSA swab  - post-op nafcillin will stop  - PCT      Will continue GOC w fam

## 2022-02-26 NOTE — PROVIDER CONTACT NOTE (OTHER) - SITUATION
EVD placed in OR this evening no longer has clear indication of patency. Bubbles move sluggishly when attempting to assess meniscus movement.

## 2022-02-26 NOTE — PROGRESS NOTE ADULT - ASSESSMENT
46M s/p TBI s/p attempted EVD, ICP monitor, ICP crisis s/p mannitol/hypertonics/sedation now s/p L hemicrani for decompression with poor postop exam and extensive multifocal infarct.   - no further neurosurgical interventions  - normotension, goal normonatremia (going into DI)  - no plan for ICP monitor at this time, not trending ICPs from EVD (no output or waveform)  - consider dc EVD in Am  - cont C disc  - med mgmt pending code status

## 2022-02-26 NOTE — PROVIDER CONTACT NOTE (OTHER) - BACKGROUND
Pt with SAH, IVH, ICH s/p left nicholas-craniectomy and EVD placement 2/26 evening. EVD unclamped at 0mmHg

## 2022-02-26 NOTE — PROVIDER CONTACT NOTE (OTHER) - BACKGROUND
Pt with SAH, IVH, ICH s/p left nicholas-craniectomy and EVD placement 2/26 evening. EVD unclamped at 0mmHg Pt with SAH, IVH, ICH s/p left nicholas-craniectomy and EVD placement 2/26 evening.

## 2022-02-27 LAB
ANION GAP SERPL CALC-SCNC: 11 MMOL/L — SIGNIFICANT CHANGE UP (ref 5–17)
ANION GAP SERPL CALC-SCNC: 9 MMOL/L — SIGNIFICANT CHANGE UP (ref 5–17)
BASE EXCESS BLDA CALC-SCNC: -0.4 MMOL/L — SIGNIFICANT CHANGE UP (ref -2–3)
BASE EXCESS BLDA CALC-SCNC: 0.7 MMOL/L — SIGNIFICANT CHANGE UP (ref -2–3)
BASE EXCESS BLDA CALC-SCNC: 2.4 MMOL/L — SIGNIFICANT CHANGE UP (ref -2–3)
BUN SERPL-MCNC: 11 MG/DL — SIGNIFICANT CHANGE UP (ref 7–23)
BUN SERPL-MCNC: 9 MG/DL — SIGNIFICANT CHANGE UP (ref 7–23)
CALCIUM SERPL-MCNC: 8.1 MG/DL — LOW (ref 8.4–10.5)
CALCIUM SERPL-MCNC: 8.5 MG/DL — SIGNIFICANT CHANGE UP (ref 8.4–10.5)
CHLORIDE SERPL-SCNC: 121 MMOL/L — HIGH (ref 96–108)
CHLORIDE SERPL-SCNC: 124 MMOL/L — HIGH (ref 96–108)
CO2 BLDA-SCNC: 28 MMOL/L — HIGH (ref 19–24)
CO2 BLDA-SCNC: 32 MMOL/L — HIGH (ref 19–24)
CO2 BLDA-SCNC: 32 MMOL/L — HIGH (ref 19–24)
CO2 SERPL-SCNC: 23 MMOL/L — SIGNIFICANT CHANGE UP (ref 22–31)
CO2 SERPL-SCNC: 25 MMOL/L — SIGNIFICANT CHANGE UP (ref 22–31)
CREAT SERPL-MCNC: 0.33 MG/DL — LOW (ref 0.5–1.3)
CREAT SERPL-MCNC: 0.43 MG/DL — LOW (ref 0.5–1.3)
GAS PNL BLDA: SIGNIFICANT CHANGE UP
GLUCOSE BLDC GLUCOMTR-MCNC: 119 MG/DL — HIGH (ref 70–99)
GLUCOSE BLDC GLUCOMTR-MCNC: 127 MG/DL — HIGH (ref 70–99)
GLUCOSE BLDC GLUCOMTR-MCNC: 133 MG/DL — HIGH (ref 70–99)
GLUCOSE BLDC GLUCOMTR-MCNC: 133 MG/DL — HIGH (ref 70–99)
GLUCOSE BLDC GLUCOMTR-MCNC: 135 MG/DL — HIGH (ref 70–99)
GLUCOSE BLDC GLUCOMTR-MCNC: 139 MG/DL — HIGH (ref 70–99)
GLUCOSE BLDC GLUCOMTR-MCNC: 140 MG/DL — HIGH (ref 70–99)
GLUCOSE BLDC GLUCOMTR-MCNC: 142 MG/DL — HIGH (ref 70–99)
GLUCOSE BLDC GLUCOMTR-MCNC: 145 MG/DL — HIGH (ref 70–99)
GLUCOSE BLDC GLUCOMTR-MCNC: 147 MG/DL — HIGH (ref 70–99)
GLUCOSE BLDC GLUCOMTR-MCNC: 149 MG/DL — HIGH (ref 70–99)
GLUCOSE BLDC GLUCOMTR-MCNC: 154 MG/DL — HIGH (ref 70–99)
GLUCOSE BLDC GLUCOMTR-MCNC: 158 MG/DL — HIGH (ref 70–99)
GLUCOSE BLDC GLUCOMTR-MCNC: 158 MG/DL — HIGH (ref 70–99)
GLUCOSE BLDC GLUCOMTR-MCNC: 160 MG/DL — HIGH (ref 70–99)
GLUCOSE BLDC GLUCOMTR-MCNC: 168 MG/DL — HIGH (ref 70–99)
GLUCOSE BLDC GLUCOMTR-MCNC: 173 MG/DL — HIGH (ref 70–99)
GLUCOSE BLDC GLUCOMTR-MCNC: 178 MG/DL — HIGH (ref 70–99)
GLUCOSE BLDC GLUCOMTR-MCNC: 203 MG/DL — HIGH (ref 70–99)
GLUCOSE BLDC GLUCOMTR-MCNC: 233 MG/DL — HIGH (ref 70–99)
GLUCOSE BLDC GLUCOMTR-MCNC: 263 MG/DL — HIGH (ref 70–99)
GLUCOSE SERPL-MCNC: 170 MG/DL — HIGH (ref 70–99)
GLUCOSE SERPL-MCNC: 184 MG/DL — HIGH (ref 70–99)
HCO3 BLDA-SCNC: 27 MMOL/L — SIGNIFICANT CHANGE UP (ref 21–28)
HCO3 BLDA-SCNC: 30 MMOL/L — HIGH (ref 21–28)
HCO3 BLDA-SCNC: 30 MMOL/L — HIGH (ref 21–28)
HCT VFR BLD CALC: 29.8 % — LOW (ref 39–50)
HGB BLD-MCNC: 9.6 G/DL — LOW (ref 13–17)
HOROWITZ INDEX BLDA+IHG-RTO: 21 — SIGNIFICANT CHANGE UP
HOROWITZ INDEX BLDA+IHG-RTO: 21 — SIGNIFICANT CHANGE UP
HOROWITZ INDEX BLDA+IHG-RTO: 30 — SIGNIFICANT CHANGE UP
MAGNESIUM SERPL-MCNC: 1.9 MG/DL — SIGNIFICANT CHANGE UP (ref 1.6–2.6)
MAGNESIUM SERPL-MCNC: 2 MG/DL — SIGNIFICANT CHANGE UP (ref 1.6–2.6)
MCHC RBC-ENTMCNC: 32.1 PG — SIGNIFICANT CHANGE UP (ref 27–34)
MCHC RBC-ENTMCNC: 32.2 GM/DL — SIGNIFICANT CHANGE UP (ref 32–36)
MCV RBC AUTO: 99.7 FL — SIGNIFICANT CHANGE UP (ref 80–100)
NRBC # BLD: 0 /100 WBCS — SIGNIFICANT CHANGE UP (ref 0–0)
OSMOLALITY SERPL: 313 MOSMOL/KG — HIGH (ref 275–300)
OSMOLALITY SERPL: 316 MOSMOL/KG — HIGH (ref 275–300)
OSMOLALITY SERPL: 323 MOSMOL/KG — HIGH (ref 275–300)
PCO2 BLDA: 42 MMHG — SIGNIFICANT CHANGE UP (ref 35–48)
PCO2 BLDA: 70 MMHG — CRITICAL HIGH (ref 35–48)
PCO2 BLDA: 76 MMHG — CRITICAL HIGH (ref 35–48)
PH BLDA: 7.2 — CRITICAL LOW (ref 7.35–7.45)
PH BLDA: 7.24 — LOW (ref 7.35–7.45)
PH BLDA: 7.42 — SIGNIFICANT CHANGE UP (ref 7.35–7.45)
PHOSPHATE SERPL-MCNC: 2.2 MG/DL — LOW (ref 2.5–4.5)
PHOSPHATE SERPL-MCNC: 2.7 MG/DL — SIGNIFICANT CHANGE UP (ref 2.5–4.5)
PLATELET # BLD AUTO: 80 K/UL — LOW (ref 150–400)
PO2 BLDA: 162 MMHG — HIGH (ref 83–108)
PO2 BLDA: 165 MMHG — HIGH (ref 83–108)
PO2 BLDA: 75 MMHG — LOW (ref 83–108)
POTASSIUM SERPL-MCNC: 3.3 MMOL/L — LOW (ref 3.5–5.3)
POTASSIUM SERPL-MCNC: 3.7 MMOL/L — SIGNIFICANT CHANGE UP (ref 3.5–5.3)
POTASSIUM SERPL-SCNC: 3.3 MMOL/L — LOW (ref 3.5–5.3)
POTASSIUM SERPL-SCNC: 3.7 MMOL/L — SIGNIFICANT CHANGE UP (ref 3.5–5.3)
RBC # BLD: 2.99 M/UL — LOW (ref 4.2–5.8)
RBC # FLD: 14.6 % — HIGH (ref 10.3–14.5)
SAO2 % BLDA: 96.1 % — SIGNIFICANT CHANGE UP (ref 94–98)
SAO2 % BLDA: 99.2 % — HIGH (ref 94–98)
SAO2 % BLDA: 99.7 % — HIGH (ref 94–98)
SODIUM SERPL-SCNC: 155 MMOL/L — HIGH (ref 135–145)
SODIUM SERPL-SCNC: 158 MMOL/L — HIGH (ref 135–145)
WBC # BLD: 9.37 K/UL — SIGNIFICANT CHANGE UP (ref 3.8–10.5)
WBC # FLD AUTO: 9.37 K/UL — SIGNIFICANT CHANGE UP (ref 3.8–10.5)

## 2022-02-27 PROCEDURE — 71045 X-RAY EXAM CHEST 1 VIEW: CPT | Mod: 26

## 2022-02-27 PROCEDURE — 93970 EXTREMITY STUDY: CPT | Mod: 26

## 2022-02-27 PROCEDURE — 99291 CRITICAL CARE FIRST HOUR: CPT

## 2022-02-27 RX ORDER — PHENYLEPHRINE HYDROCHLORIDE 10 MG/ML
0.4 INJECTION INTRAVENOUS
Qty: 40 | Refills: 0 | Status: DISCONTINUED | OUTPATIENT
Start: 2022-02-27 | End: 2022-02-27

## 2022-02-27 RX ORDER — POTASSIUM CHLORIDE 20 MEQ
20 PACKET (EA) ORAL ONCE
Refills: 0 | Status: COMPLETED | OUTPATIENT
Start: 2022-02-27 | End: 2022-02-27

## 2022-02-27 RX ORDER — SODIUM CHLORIDE 9 MG/ML
1000 INJECTION, SOLUTION INTRAVENOUS
Refills: 0 | Status: DISCONTINUED | OUTPATIENT
Start: 2022-02-27 | End: 2022-02-28

## 2022-02-27 RX ADMIN — Medication 5.89 MICROGRAM(S)/KG/MIN: at 21:38

## 2022-02-27 RX ADMIN — MUPIROCIN 1 APPLICATION(S): 20 OINTMENT TOPICAL at 18:23

## 2022-02-27 RX ADMIN — PANTOPRAZOLE SODIUM 40 MILLIGRAM(S): 20 TABLET, DELAYED RELEASE ORAL at 12:49

## 2022-02-27 RX ADMIN — SODIUM CHLORIDE 50 MILLILITER(S): 9 INJECTION, SOLUTION INTRAVENOUS at 00:06

## 2022-02-27 RX ADMIN — Medication 105 MILLIGRAM(S): at 12:51

## 2022-02-27 RX ADMIN — SODIUM CHLORIDE 25 MILLILITER(S): 9 INJECTION, SOLUTION INTRAVENOUS at 02:00

## 2022-02-27 RX ADMIN — SODIUM CHLORIDE 25 MILLILITER(S): 9 INJECTION, SOLUTION INTRAVENOUS at 09:50

## 2022-02-27 RX ADMIN — BROMOCRIPTINE MESYLATE 5 MILLIGRAM(S): 5 CAPSULE ORAL at 05:07

## 2022-02-27 RX ADMIN — VASOPRESSIN 2.4 UNIT(S)/MIN: 20 INJECTION INTRAVENOUS at 21:38

## 2022-02-27 RX ADMIN — Medication 20 MILLIEQUIVALENT(S): at 02:46

## 2022-02-27 RX ADMIN — SODIUM CHLORIDE 25 MILLILITER(S): 9 INJECTION, SOLUTION INTRAVENOUS at 21:38

## 2022-02-27 RX ADMIN — BROMOCRIPTINE MESYLATE 5 MILLIGRAM(S): 5 CAPSULE ORAL at 21:38

## 2022-02-27 RX ADMIN — Medication 1 TABLET(S): at 12:48

## 2022-02-27 RX ADMIN — Medication 64.8 MILLIGRAM(S): at 18:22

## 2022-02-27 RX ADMIN — VASOPRESSIN 2.4 UNIT(S)/MIN: 20 INJECTION INTRAVENOUS at 09:50

## 2022-02-27 RX ADMIN — Medication 5.89 MICROGRAM(S)/KG/MIN: at 09:49

## 2022-02-27 RX ADMIN — CHLORHEXIDINE GLUCONATE 15 MILLILITER(S): 213 SOLUTION TOPICAL at 18:23

## 2022-02-27 RX ADMIN — Medication 1 MILLIGRAM(S): at 12:49

## 2022-02-27 RX ADMIN — Medication 64.8 MILLIGRAM(S): at 05:07

## 2022-02-27 RX ADMIN — INSULIN HUMAN 6 UNIT(S)/HR: 100 INJECTION, SOLUTION SUBCUTANEOUS at 09:50

## 2022-02-27 RX ADMIN — MUPIROCIN 1 APPLICATION(S): 20 OINTMENT TOPICAL at 05:16

## 2022-02-27 RX ADMIN — INSULIN HUMAN 6 UNIT(S)/HR: 100 INJECTION, SOLUTION SUBCUTANEOUS at 21:38

## 2022-02-27 RX ADMIN — BROMOCRIPTINE MESYLATE 5 MILLIGRAM(S): 5 CAPSULE ORAL at 15:00

## 2022-02-27 RX ADMIN — CHLORHEXIDINE GLUCONATE 15 MILLILITER(S): 213 SOLUTION TOPICAL at 05:07

## 2022-02-27 NOTE — PROGRESS NOTE ADULT - SUBJECTIVE AND OBJECTIVE BOX
46 year-old man with history of recent heavy EtOH use, hypertension, possible ASA use, hyperlipidemia and diabetes mellitus II was taken to [facility] 2/25/22 for vomiting since 2/24/22 PM and altered mental status. At OSH, his eyes opened to voice, but he was non-verbal and not following commands but was moving all limbs symmetrically and strongly. CT Head revealed bifrontal contusions, left temporal contusion, bifrontal subdural hematomas and traumatic subarachnoid hemorrhage. He was transferred to [facility] ED where his metal status declined further, resulting in intubation for airway protection. Repeat CT with increased hemorrhage.        24 HOUR EVENTS:   - taken emergently to OR for refractory ICP crisis now s/p L hemicrani for decompression w 400cc EBL        VITALS:  - Reviewed    LABS:  - Reviewed      MEDICATIONS:  - Reviewed      EXAMINATION:  General: in NAD  HEENT: MMM  Cards: RRR  Respiratory: no respiratory distress  Abdomen: soft  Extremities:  no LE edema  Neuro: pupils 3mm NR, no cough/gag, +overbreathing, no corneal no dolls nothing uppers TF om lowers     46 year-old man with history of recent heavy EtOH use, hypertension, possible ASA use, hyperlipidemia and diabetes mellitus II was taken to [facility] 2/25/22 for vomiting since 2/24/22 PM and altered mental status. At OSH, his eyes opened to voice, but he was non-verbal and not following commands but was moving all limbs symmetrically and strongly. CT Head revealed bifrontal contusions, left temporal contusion, bifrontal subdural hematomas and traumatic subarachnoid hemorrhage. He was transferred to [facility] ED where his metal status declined further, resulting in intubation for airway protection. Repeat CT with increased hemorrhage.        24 HOUR EVENTS:   - on insulin gtt for hyperglycemia, sodium improving        VITALS:  - Reviewed    LABS:  - Reviewed      MEDICATIONS:  - Reviewed      EXAMINATION:  General: ETT to vent  HEENT: MMM  Cards: RRR  Respiratory: no respiratory distress, mechanical bs  Abdomen: soft  Extremities:  no LE edema  Neuro: pupils 3mm NR, no cough/gag, not overbreathing, no corneal no dolls nothing uppers TF om lowers     46 year-old man with history of recent heavy EtOH use, hypertension, possible ASA use, hyperlipidemia and diabetes mellitus II was taken to [facility] 2/25/22 for vomiting since 2/24/22 PM and altered mental status. At OSH, his eyes opened to voice, but he was non-verbal and not following commands but was moving all limbs symmetrically and strongly. CT Head revealed bifrontal contusions, left temporal contusion, bifrontal subdural hematomas and traumatic subarachnoid hemorrhage. He was transferred to [facility] ED where his metal status declined further, resulting in intubation for airway protection. Repeat CT with increased hemorrhage.        24 HOUR EVENTS:   - on insulin gtt for hyperglycemia, sodium improving        VITALS:  T(C): 37 (02-27-22 @ 07:00), Max: 37.5 (02-26-22 @ 08:00)  T(F): 98.6 (02-27-22 @ 07:00), Max: 99.5 (02-26-22 @ 08:00)  HR: 98 (02-27-22 @ 07:00) (84 - 112)  BP: --  ABP: 119/66 (02-27-22 @ 07:00) (90/56 - 141/76)  ABP(mean): 86 (02-27-22 @ 07:00) (63 - 99)  RR: 16 (02-27-22 @ 07:00) (12 - 19)  SpO2: 100% (02-27-22 @ 07:00) (98% - 100%)      LABS:  02-27    158<H>  |  124<H>  |  11  ----------------------------<  170<H>  3.7   |  23  |  0.43<L>    Ca    8.5      27 Feb 2022 04:16  Phos  2.8     02-26  Mg     2.2     02-26    TPro  6.0  /  Alb  3.5  /  TBili  5.4<H>  /  DBili  3.4<H>  /  AST  85<H>  /  ALT  88<H>  /  AlkPhos  256<H>  02-25                          10.7   11.31 )-----------( 93       ( 26 Feb 2022 22:25 )             32.3         MEDICATIONS:  - Reviewed      EXAMINATION:  General: ETT to vent  HEENT: MMM  Cards: RRR  Respiratory: no respiratory distress, mechanical bs  Abdomen: soft  Extremities:  no LE edema  Neuro: pupils 3mm NR, no cough/gag, not overbreathing, no corneal no dolls nothing uppers TF om lowers

## 2022-02-27 NOTE — PHARMACOTHERAPY INTERVENTION NOTE - COMMENTS
Deni from [facility] reached out to pharmacy inquiring about the elimination half-lives for several medications. Called back and spoke to Giovani [facility] PA regarding the below medications. Information was provided by Shawna.    > Propofol terminal half-life: 4 to 7 hours; therefore 5 half-lives would be 20 to 35 hours since the last dose. Last dose given to patient was at 5:07 on 2/26/22, thus 35 hours has not yet lapsed. Discussed with Giovani, that 35 hours will lapse at 16:07 today 2/27/22.    > Fentanyl terminal half-life: 2 to 4 hours; therefore 5 half-lives would be 10 to 20 hours since the last dose. Last dose given to patient was at 12:10 on 2/25/22, thus 20 hours has lapsed.    > Midazolam terminal half-life: 2 to 7 hours; therefore 5 half-lives would be 10 to 35 hours since the last dose. Last dose given to patient was at 12:10 on 2/25/22, thus 35 hours has lapsed.    > Pentobarbital terminal half-life: 15 to 50 hours, average is 22 hours; 5 half-live based on the average is 110 hours since the last dose.  Based on EMAR, does not appear patient was given pentobarbital.    Recommendations:  > Wait until 16:07 PM today (2/27/22) for 5 half-lives of the propofol to have lapsed.    Thank you,    Tamar Dunne PharmD, PGY-1 Pharmacy Resident  Available on [employer] [id number]

## 2022-02-27 NOTE — PROGRESS NOTE ADULT - SUBJECTIVE AND OBJECTIVE BOX
Patient seen and examined at bedside.    --Anticoagulation--    T(C): 37.1 (02-27-22 @ 02:00), Max: 37.5 (02-26-22 @ 08:00)  HR: 105 (02-27-22 @ 02:30) (84 - 114)  BP: --  RR: 14 (02-27-22 @ 02:30) (10 - 19)  SpO2: 100% (02-27-22 @ 02:30) (100% - 100%)  Wt(kg): --    Exam:  Pupils 3 NR, no brainstem reflexes,  Nothing UE, TF LE

## 2022-02-27 NOTE — PROGRESS NOTE ADULT - ASSESSMENT
Assessment/Plan:     Multifocal traumatic contusions, subdural hematomas and subarachnoid hemorrhage w/ refractory ICP crisis s/p emergent L decompressive hemicrani, now with L hemispheric stroke with no plans for further NSG intervention      NEURO:  s/p  23.4% and 60g mannitolx2, started on prop, versed, fentnayl on 2/25  CTH with L hemispheric stroke; no further NSG intervention  - Neurochecks q4h  - per NSG discussion with family, poor prognosis; no further NSG intervention; ongoing goc with family  - EVD@0 no output, no ICP monitoring per nsg and plan to poss remove tomorrow  - Sedation: minimize to not conflict w exam; possible brain death examination, off propofol and sedatives  - no eeg per nsg  - C-collar removed prior to OR by NSG  - Etoh/SZ: phenobarb 64q12h CIWA, MVT, Thiamine IV [id number] then 100mg PO  - Central Fever/Shivering: Arctic Sun PRN, tylenol  - Bromocriptine 5q8h    PULM:  - Vent support; VAP bundle  - CXR ett in place  - ABG reviewed  - wean FiO2 as tolerated  - ABG am    CV:  - MAP goal >65 for now wean pressors as tolerated currently on demetrice will switch to levo  - titrate uop >0.5cc/kg/hr    RENAL:   DI  - Fluids: D5 1/2 NS 50cc/h  - keep Na <160  - Thompson  - BMPq6  - Monitor I/Os  - FWB 300q6h    GI:  - Diet: NPO, OGT  - GI prophylaxis PPI while intubated  - Bowel regimen; avoid constipation as will increased ICP  - CMP, lipid panel    ENDO:   DKA , cont ins gtt for now  s/p Bicarb x2  - Goal euglycemia (-180)  - Insulin gtt  - ABG, BMP    HEME/ONC:  ddAVP and platelets given ASA exposure  - VTE prophylaxis: SCDs  - hold chemoppx d/t fresh heme and post op  - TEG, ARU, PRU    ID:  Febrile, presumed aspiration  - Pip/tazo - tailor antibiotics as cultures/sensitivities available on zosyn  - FU cultures  - MRSA swab  - post-op nafcillin will stop  - PCT      Will continue GOC w fam Assessment/Plan:     Multifocal traumatic contusions, subdural hematomas and subarachnoid hemorrhage w/ refractory ICP crisis s/p emergent L decompressive hemicrani, now with L hemispheric stroke with no plans for further NSG intervention      NEURO:  s/p  23.4% and 60g mannitolx2, started on prop, versed, fentnayl on 2/25  CTH with L hemispheric stroke; no further NSG intervention  - Neurochecks q4h  - per NSG discussion with family, poor prognosis; no further NSG intervention; ongoing goc with family  - EVD@0 no output, no ICP monitoring per nsg and plan to poss remove tomorrow  - Sedation: minimize to not conflict w exam; possible brain death examination, off propofol and sedatives  - no eeg per nsg  - C-collar removed prior to OR by NSG  - Etoh/SZ: phenobarb 64q12h CIWA, MVT, Thiamine IV [id number] then 100mg PO  - Central Fever/Shivering: Arctic Sun PRN, tylenol  - Bromocriptine 5q8h    PULM:  - Vent support; VAP bundle  - CXR ett in place  - ABG reviewed  - wean FiO2 as tolerated  - ABG am    CV:  - MAP goal >65 for now wean pressors as tolerated currently on demetrice will switch to levo  - titrate uop >0.5cc/kg/hr    RENAL:   DI  - Fluids: D5 1/2 NS 25cc/h  - keep Na <160  - Thompson  - BMPq6  - Monitor I/Os  - FWB 300q6h    GI:  - Diet: NPO, OGT  - GI prophylaxis PPI while intubated  - Bowel regimen; avoid constipation as will increased ICP  - CMP, lipid panel    ENDO:   DKA , cont ins gtt for now  s/p Bicarb x2  - Goal euglycemia (-180)  - Insulin gtt  - ABG, BMP    HEME/ONC:  ddAVP and platelets given ASA exposure  - VTE prophylaxis: SCDs  - hold chemoppx d/t fresh heme and post op  - TEG, ARU, PRU    ID:  Febrile, presumed aspiration  - Pip/tazo - tailor antibiotics as cultures/sensitivities available on zosyn  - FU cultures  - MRSA swab  - post-op nafcillin will stop  - PCT      Will continue GOC w fam Assessment/Plan:     Multifocal traumatic contusions, subdural hematomas and subarachnoid hemorrhage w/ refractory ICP crisis s/p emergent L decompressive hemicrani, now with L hemispheric stroke with no plans for further NSG intervention      NEURO:  s/p  23.4% and 60g mannitolx2, started on prop, versed, fentnayl on 2/25  CTH with L hemispheric stroke; no further NSG intervention  - Neurochecks q4h  - per NSG discussion with family, poor prognosis; no further NSG intervention; ongoing goc with family; plan for possible brain death exam today pending family  - EVD@0 no output, no ICP monitoring per nsg and plan to poss remove tomorrow  - Etoh/SZ: phenobarb 64q12h CIWA, MVT, Thiamine IV [id number] then 100mg PO  - Central Fever/Shivering: Arctic Sun PRN, tylenol  - Bromocriptine 5q8h    PULM:  - Vent support; VAP bundle  - ABG reviewed  - wean FiO2 as tolerated    CV:  - MAP goal >65 for now wean pressors as tolerated currently on levo    RENAL:   DI  - Fluids: D5 1/2 NS 25cc/h  - keep Na <160  - Thompson  - BMPq6  - Monitor I/Os  - FWB 300q6h    GI:  - Diet: NPO, OGT  - GI prophylaxis PPI while intubated  - Bowel regimen; avoid constipation as will increased ICP    ENDO:   DKA , cont ins gtt for now  s/p Bicarb x2  - Goal euglycemia (-180)  - Insulin gtt  - ABG, BMP    HEME/ONC:  ddAVP and platelets given ASA exposure  - VTE prophylaxis: SCDs  - hold chemoppx d/t fresh heme and post op  - TEG, ARU, PRU    ID:  Febrile, presumed aspiration  - Pip/tazo - tailor antibiotics as cultures/sensitivities available on zosyn  - FU cultures  - MRSA swab  - post-op nafcillin will stop  - PCT      Will continue GOC w fam

## 2022-02-27 NOTE — DISCHARGE NOTE FOR THE EXPIRED PATIENT - HOSPITAL COURSE
46 yr old M was transferred from [facility] on 2/25. Patient p/w AMS and vomiting since 2/24 PM and per daughter, patient has been drinking due to recent death of family member. Patient has unwitnessed fall with posterior head bump per [facility] chart notes. CT was performed and shows bilateral frontal and Left temporal contusions. Pt intubated by ED for decreasing GCS and agitation, placed on keppra and screened for BOOST and ASTRAL trial. Pt had EVD placed, multiple attempts were made, bolt placed for ICP monitoring. Pt ICPs in 50-70s and given 23.4% and 60g mannitol. Placed on propofol sedation. Pt noted to be febrile and placed on Arctic sun. Continued hyperglycemia with acidosis/respiratory compensation, placed on Insulin gtt. Given persistent increased ICP, additional mannitol given. Propofol maxed. Fentanyl gtt maxed. Given 5mg Versed for additional sedation. Started on norepinephrine gtt in attempt to optimize CPP given refractory ICP. Patient had repeat CT performed in unit, showing increased hemorrhage, midline shift and compression of ventricles. Pt was brought to OR for Left Hemicraniectomy for Decompression. Post op CT showed Successful hemicraniectomy and extensive multifocal infarct with poor postop exam. Prognosis discussed with Family and decision to move forward with Organ donation through [facility] was continued. On 2/27/22, An Apnea test was performed and the patient was pronounced brain dead at 17:42 due to official reading of ABG.  46 yr old M was transferred from [facility] on 2/25. Patient p/w AMS and vomiting since 2/24 PM and per daughter, patient has been drinking due to recent death of family member. Patient has unwitnessed fall with posterior head bump per [facility] chart notes. CT was performed and shows bilateral frontal and Left temporal contusions. Pt intubated by ED for decreasing GCS and agitation, placed on keppra and screened for BOOST and ASTRAL trial. Pt had attempted EVD, multiple attempts were made, but unable to be placed successfully so bolt placed for ICP monitoring. Pt ICPs in 50-70s and given 23.4% and 60g mannitol. Placed on propofol sedation. Pt noted to be febrile and placed on Arctic sun. Continued hyperglycemia with acidosis/respiratory compensation, placed on Insulin gtt. Given persistent increased ICP, additional mannitol given. Propofol maxed. Fentanyl gtt maxed. Given 5mg Versed for additional sedation. Started on norepinephrine gtt in attempt to optimize CPP given refractory ICP. Patient had repeat CT performed in unit, showing increased hemorrhage, midline shift and compression of ventricles. Pt was brought to OR for Left Hemicraniectomy for Decompression. Post op CT showed Successful hemicraniectomy and extensive multifocal infarct with poor postop exam. Prognosis discussed with Family and decision to move forward with Organ donation through [facility] was continued. On 2/27/22, An Apnea test was performed and the patient was diagnosed with brain death at 17:42 due to official reading of ABG PH: 7.24 Pco2: 70 Po2: 162 HCO3: 30.

## 2022-02-27 NOTE — PROGRESS NOTE ADULT - ASSESSMENT
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      - Poor exam with evidence of extensive stroke in L hemisphere   - [facility]

## 2022-02-27 NOTE — PROGRESS NOTE ADULT - THIS PATIENT HAS THE FOLLOWING CONDITION(S)/DIAGNOSES ON THIS ADMISSION:
Cerebral Edema/Brain Compression / Herniation
None
None
Cerebral Edema/Brain Compression / Herniation/Acute Respiratory Failure
None
Cerebral Edema/Brain Compression / Herniation/Acute Respiratory Failure

## 2022-02-28 LAB
A1C WITH ESTIMATED AVERAGE GLUCOSE RESULT: 9.6 % — HIGH (ref 4–5.6)
ALBUMIN SERPL ELPH-MCNC: 2.6 G/DL — LOW (ref 3.3–5)
ALBUMIN SERPL ELPH-MCNC: 2.7 G/DL — LOW (ref 3.3–5)
ALBUMIN SERPL ELPH-MCNC: 2.8 G/DL — LOW (ref 3.3–5)
ALBUMIN SERPL ELPH-MCNC: 2.8 G/DL — LOW (ref 3.3–5)
ALP SERPL-CCNC: 220 U/L — HIGH (ref 40–120)
ALP SERPL-CCNC: 224 U/L — HIGH (ref 40–120)
ALP SERPL-CCNC: 226 U/L — HIGH (ref 40–120)
ALP SERPL-CCNC: 237 U/L — HIGH (ref 40–120)
ALT FLD-CCNC: 46 U/L — HIGH (ref 10–45)
ALT FLD-CCNC: 50 U/L — HIGH (ref 10–45)
ALT FLD-CCNC: 52 U/L — HIGH (ref 10–45)
ALT FLD-CCNC: 52 U/L — HIGH (ref 10–45)
AMYLASE P1 CFR SERPL: 41 U/L — SIGNIFICANT CHANGE UP (ref 25–125)
AMYLASE P1 CFR SERPL: 48 U/L — SIGNIFICANT CHANGE UP (ref 25–125)
AMYLASE P1 CFR SERPL: 66 U/L — SIGNIFICANT CHANGE UP (ref 25–125)
AMYLASE P1 CFR SERPL: 70 U/L — SIGNIFICANT CHANGE UP (ref 25–125)
ANION GAP SERPL CALC-SCNC: 12 MMOL/L — SIGNIFICANT CHANGE UP (ref 5–17)
ANION GAP SERPL CALC-SCNC: 14 MMOL/L — SIGNIFICANT CHANGE UP (ref 5–17)
ANION GAP SERPL CALC-SCNC: 15 MMOL/L — SIGNIFICANT CHANGE UP (ref 5–17)
ANION GAP SERPL CALC-SCNC: 17 MMOL/L — SIGNIFICANT CHANGE UP (ref 5–17)
APPEARANCE UR: CLEAR — SIGNIFICANT CHANGE UP
APPEARANCE UR: CLEAR — SIGNIFICANT CHANGE UP
APTT BLD: 30.5 SEC — SIGNIFICANT CHANGE UP (ref 27.5–35.5)
APTT BLD: 30.9 SEC — SIGNIFICANT CHANGE UP (ref 27.5–35.5)
APTT BLD: 31.8 SEC — SIGNIFICANT CHANGE UP (ref 27.5–35.5)
AST SERPL-CCNC: 43 U/L — HIGH (ref 10–40)
AST SERPL-CCNC: 46 U/L — HIGH (ref 10–40)
AST SERPL-CCNC: 53 U/L — HIGH (ref 10–40)
AST SERPL-CCNC: 54 U/L — HIGH (ref 10–40)
BACTERIA # UR AUTO: NEGATIVE — SIGNIFICANT CHANGE UP
BACTERIA # UR AUTO: NEGATIVE — SIGNIFICANT CHANGE UP
BASE EXCESS BLDA CALC-SCNC: -2.3 MMOL/L — LOW (ref -2–3)
BASE EXCESS BLDA CALC-SCNC: 0.4 MMOL/L — SIGNIFICANT CHANGE UP (ref -2–3)
BASE EXCESS BLDA CALC-SCNC: 1.7 MMOL/L — SIGNIFICANT CHANGE UP (ref -2–3)
BASOPHILS # BLD AUTO: 0 K/UL — SIGNIFICANT CHANGE UP (ref 0–0.2)
BASOPHILS # BLD AUTO: 0.03 K/UL — SIGNIFICANT CHANGE UP (ref 0–0.2)
BASOPHILS NFR BLD AUTO: 0 % — SIGNIFICANT CHANGE UP (ref 0–2)
BASOPHILS NFR BLD AUTO: 0.4 % — SIGNIFICANT CHANGE UP (ref 0–2)
BILIRUB DIRECT SERPL-MCNC: 1.4 MG/DL — HIGH (ref 0–0.3)
BILIRUB DIRECT SERPL-MCNC: 1.7 MG/DL — HIGH (ref 0–0.3)
BILIRUB DIRECT SERPL-MCNC: 2 MG/DL — HIGH (ref 0–0.3)
BILIRUB DIRECT SERPL-MCNC: 2.2 MG/DL — HIGH (ref 0–0.3)
BILIRUB INDIRECT FLD-MCNC: 1 MG/DL — SIGNIFICANT CHANGE UP (ref 0.2–1)
BILIRUB INDIRECT FLD-MCNC: 1.2 MG/DL — HIGH (ref 0.2–1)
BILIRUB INDIRECT FLD-MCNC: 1.6 MG/DL — HIGH (ref 0.2–1)
BILIRUB INDIRECT FLD-MCNC: 1.6 MG/DL — HIGH (ref 0.2–1)
BILIRUB SERPL-MCNC: 2.4 MG/DL — HIGH (ref 0.2–1.2)
BILIRUB SERPL-MCNC: 2.9 MG/DL — HIGH (ref 0.2–1.2)
BILIRUB SERPL-MCNC: 3.6 MG/DL — HIGH (ref 0.2–1.2)
BILIRUB SERPL-MCNC: 3.8 MG/DL — HIGH (ref 0.2–1.2)
BILIRUB UR-MCNC: ABNORMAL
BILIRUB UR-MCNC: ABNORMAL
BLD GP AB SCN SERPL QL: NEGATIVE — SIGNIFICANT CHANGE UP
BUN SERPL-MCNC: 11 MG/DL — SIGNIFICANT CHANGE UP (ref 7–23)
BUN SERPL-MCNC: 12 MG/DL — SIGNIFICANT CHANGE UP (ref 7–23)
BUN SERPL-MCNC: 8 MG/DL — SIGNIFICANT CHANGE UP (ref 7–23)
BUN SERPL-MCNC: 9 MG/DL — SIGNIFICANT CHANGE UP (ref 7–23)
CALCIUM SERPL-MCNC: 7.9 MG/DL — LOW (ref 8.4–10.5)
CALCIUM SERPL-MCNC: 8.1 MG/DL — LOW (ref 8.4–10.5)
CALCIUM SERPL-MCNC: 8.2 MG/DL — LOW (ref 8.4–10.5)
CALCIUM SERPL-MCNC: 8.3 MG/DL — LOW (ref 8.4–10.5)
CALCOFLUOR WHITE SPEC: SIGNIFICANT CHANGE UP
CHLORIDE SERPL-SCNC: 113 MMOL/L — HIGH (ref 96–108)
CHLORIDE SERPL-SCNC: 113 MMOL/L — HIGH (ref 96–108)
CHLORIDE SERPL-SCNC: 114 MMOL/L — HIGH (ref 96–108)
CHLORIDE SERPL-SCNC: 116 MMOL/L — HIGH (ref 96–108)
CK MB BLD-MCNC: 1.6 % — SIGNIFICANT CHANGE UP (ref 0–3.5)
CK MB BLD-MCNC: 1.7 % — SIGNIFICANT CHANGE UP (ref 0–3.5)
CK MB CFR SERPL CALC: 5.3 NG/ML — SIGNIFICANT CHANGE UP (ref 0–6.7)
CK MB CFR SERPL CALC: 6.3 NG/ML — SIGNIFICANT CHANGE UP (ref 0–6.7)
CK MB CFR SERPL CALC: 7.2 NG/ML — HIGH (ref 0–6.7)
CK MB CFR SERPL CALC: 7.2 NG/ML — HIGH (ref 0–6.7)
CK SERPL-CCNC: 334 U/L — HIGH (ref 30–200)
CK SERPL-CCNC: 386 U/L — HIGH (ref 30–200)
CK SERPL-CCNC: 415 U/L — HIGH (ref 30–200)
CK SERPL-CCNC: 441 U/L — HIGH (ref 30–200)
CO2 BLDA-SCNC: 24 MMOL/L — SIGNIFICANT CHANGE UP (ref 19–24)
CO2 BLDA-SCNC: 24 MMOL/L — SIGNIFICANT CHANGE UP (ref 19–24)
CO2 BLDA-SCNC: 27 MMOL/L — HIGH (ref 19–24)
CO2 SERPL-SCNC: 20 MMOL/L — LOW (ref 22–31)
CO2 SERPL-SCNC: 21 MMOL/L — LOW (ref 22–31)
CO2 SERPL-SCNC: 21 MMOL/L — LOW (ref 22–31)
CO2 SERPL-SCNC: 22 MMOL/L — SIGNIFICANT CHANGE UP (ref 22–31)
COLOR SPEC: ABNORMAL
COLOR SPEC: YELLOW — SIGNIFICANT CHANGE UP
CREAT SERPL-MCNC: 0.35 MG/DL — LOW (ref 0.5–1.3)
CREAT SERPL-MCNC: 0.35 MG/DL — LOW (ref 0.5–1.3)
CREAT SERPL-MCNC: 0.38 MG/DL — LOW (ref 0.5–1.3)
CREAT SERPL-MCNC: 0.38 MG/DL — LOW (ref 0.5–1.3)
DIFF PNL FLD: ABNORMAL
DIFF PNL FLD: NEGATIVE — SIGNIFICANT CHANGE UP
EGFR: 138 ML/MIN/1.73M2 — SIGNIFICANT CHANGE UP
EGFR: 141 ML/MIN/1.73M2 — SIGNIFICANT CHANGE UP
EOSINOPHIL # BLD AUTO: 0 K/UL — SIGNIFICANT CHANGE UP (ref 0–0.5)
EOSINOPHIL # BLD AUTO: 0.02 K/UL — SIGNIFICANT CHANGE UP (ref 0–0.5)
EOSINOPHIL NFR BLD AUTO: 0 % — SIGNIFICANT CHANGE UP (ref 0–6)
EOSINOPHIL NFR BLD AUTO: 0.2 % — SIGNIFICANT CHANGE UP (ref 0–6)
EPI CELLS # UR: 1 /HPF — SIGNIFICANT CHANGE UP
EPI CELLS # UR: 1 /HPF — SIGNIFICANT CHANGE UP
ESTIMATED AVERAGE GLUCOSE: 229 MG/DL — HIGH (ref 68–114)
FIBRINOGEN PPP-MCNC: 1056 MG/DL — HIGH (ref 330–520)
FIBRINOGEN PPP-MCNC: 1094 MG/DL — HIGH (ref 330–520)
FIBRINOGEN PPP-MCNC: 1100 MG/DL — HIGH (ref 330–520)
FIBRINOGEN PPP-MCNC: 1122 MG/DL — HIGH (ref 330–520)
GAS PNL BLDA: SIGNIFICANT CHANGE UP
GLUCOSE BLDC GLUCOMTR-MCNC: 148 MG/DL — HIGH (ref 70–99)
GLUCOSE BLDC GLUCOMTR-MCNC: 184 MG/DL — HIGH (ref 70–99)
GLUCOSE BLDC GLUCOMTR-MCNC: 190 MG/DL — HIGH (ref 70–99)
GLUCOSE BLDC GLUCOMTR-MCNC: 218 MG/DL — HIGH (ref 70–99)
GLUCOSE BLDC GLUCOMTR-MCNC: 220 MG/DL — HIGH (ref 70–99)
GLUCOSE BLDC GLUCOMTR-MCNC: 225 MG/DL — HIGH (ref 70–99)
GLUCOSE BLDC GLUCOMTR-MCNC: 230 MG/DL — HIGH (ref 70–99)
GLUCOSE BLDC GLUCOMTR-MCNC: 244 MG/DL — HIGH (ref 70–99)
GLUCOSE BLDC GLUCOMTR-MCNC: 246 MG/DL — HIGH (ref 70–99)
GLUCOSE BLDC GLUCOMTR-MCNC: 260 MG/DL — HIGH (ref 70–99)
GLUCOSE BLDC GLUCOMTR-MCNC: 266 MG/DL — HIGH (ref 70–99)
GLUCOSE BLDC GLUCOMTR-MCNC: 267 MG/DL — HIGH (ref 70–99)
GLUCOSE BLDC GLUCOMTR-MCNC: 287 MG/DL — HIGH (ref 70–99)
GLUCOSE BLDC GLUCOMTR-MCNC: 290 MG/DL — HIGH (ref 70–99)
GLUCOSE SERPL-MCNC: 211 MG/DL — HIGH (ref 70–99)
GLUCOSE SERPL-MCNC: 232 MG/DL — HIGH (ref 70–99)
GLUCOSE SERPL-MCNC: 257 MG/DL — HIGH (ref 70–99)
GLUCOSE SERPL-MCNC: 264 MG/DL — HIGH (ref 70–99)
GLUCOSE UR QL: ABNORMAL
GLUCOSE UR QL: ABNORMAL
GRAM STN FLD: SIGNIFICANT CHANGE UP
GRAM STN FLD: SIGNIFICANT CHANGE UP
HCO3 BLDA-SCNC: 22 MMOL/L — SIGNIFICANT CHANGE UP (ref 21–28)
HCO3 BLDA-SCNC: 23 MMOL/L — SIGNIFICANT CHANGE UP (ref 21–28)
HCO3 BLDA-SCNC: 26 MMOL/L — SIGNIFICANT CHANGE UP (ref 21–28)
HCT VFR BLD CALC: 24.1 % — LOW (ref 39–50)
HCT VFR BLD CALC: 25.1 % — LOW (ref 39–50)
HCT VFR BLD CALC: 26.8 % — LOW (ref 39–50)
HCT VFR BLD CALC: 28.4 % — LOW (ref 39–50)
HGB BLD-MCNC: 7.9 G/DL — LOW (ref 13–17)
HGB BLD-MCNC: 8.2 G/DL — LOW (ref 13–17)
HGB BLD-MCNC: 8.8 G/DL — LOW (ref 13–17)
HGB BLD-MCNC: 9.1 G/DL — LOW (ref 13–17)
HOROWITZ INDEX BLDA+IHG-RTO: 100 — SIGNIFICANT CHANGE UP
HOROWITZ INDEX BLDA+IHG-RTO: 30 — SIGNIFICANT CHANGE UP
HYALINE CASTS # UR AUTO: 1 /LPF — SIGNIFICANT CHANGE UP (ref 0–2)
HYALINE CASTS # UR AUTO: 9 /LPF — HIGH (ref 0–2)
IMM GRANULOCYTES NFR BLD AUTO: 0.7 % — SIGNIFICANT CHANGE UP (ref 0–1.5)
INR BLD: 1.21 RATIO — HIGH (ref 0.88–1.16)
INR BLD: 1.22 RATIO — HIGH (ref 0.88–1.16)
KETONES UR-MCNC: ABNORMAL
KETONES UR-MCNC: ABNORMAL
LEUKOCYTE ESTERASE UR-ACNC: NEGATIVE — SIGNIFICANT CHANGE UP
LEUKOCYTE ESTERASE UR-ACNC: NEGATIVE — SIGNIFICANT CHANGE UP
LIDOCAIN IGE QN: 7 U/L — SIGNIFICANT CHANGE UP (ref 7–60)
LIDOCAIN IGE QN: 7 U/L — SIGNIFICANT CHANGE UP (ref 7–60)
LIDOCAIN IGE QN: 8 U/L — SIGNIFICANT CHANGE UP (ref 7–60)
LIDOCAIN IGE QN: 8 U/L — SIGNIFICANT CHANGE UP (ref 7–60)
LYMPHOCYTES # BLD AUTO: 0.24 K/UL — LOW (ref 1–3.3)
LYMPHOCYTES # BLD AUTO: 0.57 K/UL — LOW (ref 1–3.3)
LYMPHOCYTES # BLD AUTO: 3.5 % — LOW (ref 13–44)
LYMPHOCYTES # BLD AUTO: 7.1 % — LOW (ref 13–44)
MAGNESIUM SERPL-MCNC: 1.9 MG/DL — SIGNIFICANT CHANGE UP (ref 1.6–2.6)
MAGNESIUM SERPL-MCNC: 2 MG/DL — SIGNIFICANT CHANGE UP (ref 1.6–2.6)
MANUAL SMEAR VERIFICATION: SIGNIFICANT CHANGE UP
MCHC RBC-ENTMCNC: 31.7 PG — SIGNIFICANT CHANGE UP (ref 27–34)
MCHC RBC-ENTMCNC: 31.9 PG — SIGNIFICANT CHANGE UP (ref 27–34)
MCHC RBC-ENTMCNC: 32 GM/DL — SIGNIFICANT CHANGE UP (ref 32–36)
MCHC RBC-ENTMCNC: 32 PG — SIGNIFICANT CHANGE UP (ref 27–34)
MCHC RBC-ENTMCNC: 32.3 PG — SIGNIFICANT CHANGE UP (ref 27–34)
MCHC RBC-ENTMCNC: 32.7 GM/DL — SIGNIFICANT CHANGE UP (ref 32–36)
MCHC RBC-ENTMCNC: 32.8 GM/DL — SIGNIFICANT CHANGE UP (ref 32–36)
MCHC RBC-ENTMCNC: 32.8 GM/DL — SIGNIFICANT CHANGE UP (ref 32–36)
MCV RBC AUTO: 100.7 FL — HIGH (ref 80–100)
MCV RBC AUTO: 96.8 FL — SIGNIFICANT CHANGE UP (ref 80–100)
MCV RBC AUTO: 97.5 FL — SIGNIFICANT CHANGE UP (ref 80–100)
MCV RBC AUTO: 97.7 FL — SIGNIFICANT CHANGE UP (ref 80–100)
MONOCYTES # BLD AUTO: 0 K/UL — SIGNIFICANT CHANGE UP (ref 0–0.9)
MONOCYTES # BLD AUTO: 0.33 K/UL — SIGNIFICANT CHANGE UP (ref 0–0.9)
MONOCYTES NFR BLD AUTO: 0 % — LOW (ref 2–14)
MONOCYTES NFR BLD AUTO: 4.1 % — SIGNIFICANT CHANGE UP (ref 2–14)
NEUTROPHILS # BLD AUTO: 6.65 K/UL — SIGNIFICANT CHANGE UP (ref 1.8–7.4)
NEUTROPHILS # BLD AUTO: 7.06 K/UL — SIGNIFICANT CHANGE UP (ref 1.8–7.4)
NEUTROPHILS NFR BLD AUTO: 87.5 % — HIGH (ref 43–77)
NEUTROPHILS NFR BLD AUTO: 93.9 % — HIGH (ref 43–77)
NEUTS BAND # BLD: 2.6 % — SIGNIFICANT CHANGE UP (ref 0–8)
NITRITE UR-MCNC: NEGATIVE — SIGNIFICANT CHANGE UP
NITRITE UR-MCNC: NEGATIVE — SIGNIFICANT CHANGE UP
NRBC # BLD: 0 /100 WBCS — SIGNIFICANT CHANGE UP (ref 0–0)
OSMOLALITY SERPL: 313 MOSMOL/KG — HIGH (ref 275–300)
OSMOLALITY UR: 787 MOS/KG — SIGNIFICANT CHANGE UP (ref 300–900)
PCO2 BLDA: 31 MMHG — LOW (ref 35–48)
PCO2 BLDA: 38 MMHG — SIGNIFICANT CHANGE UP (ref 35–48)
PCO2 BLDA: 38 MMHG — SIGNIFICANT CHANGE UP (ref 35–48)
PH BLDA: 7.38 — SIGNIFICANT CHANGE UP (ref 7.35–7.45)
PH BLDA: 7.44 — SIGNIFICANT CHANGE UP (ref 7.35–7.45)
PH BLDA: 7.48 — HIGH (ref 7.35–7.45)
PH UR: 5.5 — SIGNIFICANT CHANGE UP (ref 5–8)
PH UR: 6 — SIGNIFICANT CHANGE UP (ref 5–8)
PHOSPHATE SERPL-MCNC: 2.7 MG/DL — SIGNIFICANT CHANGE UP (ref 2.5–4.5)
PHOSPHATE SERPL-MCNC: 3.1 MG/DL — SIGNIFICANT CHANGE UP (ref 2.5–4.5)
PLAT MORPH BLD: NORMAL — SIGNIFICANT CHANGE UP
PLATELET # BLD AUTO: 64 K/UL — LOW (ref 150–400)
PLATELET # BLD AUTO: 75 K/UL — LOW (ref 150–400)
PLATELET # BLD AUTO: 80 K/UL — LOW (ref 150–400)
PLATELET # BLD AUTO: 85 K/UL — LOW (ref 150–400)
PO2 BLDA: 313 MMHG — HIGH (ref 83–108)
PO2 BLDA: 406 MMHG — HIGH (ref 83–108)
PO2 BLDA: 410 MMHG — HIGH (ref 83–108)
POTASSIUM SERPL-MCNC: 2.8 MMOL/L — CRITICAL LOW (ref 3.5–5.3)
POTASSIUM SERPL-MCNC: 3.1 MMOL/L — LOW (ref 3.5–5.3)
POTASSIUM SERPL-MCNC: 3.2 MMOL/L — LOW (ref 3.5–5.3)
POTASSIUM SERPL-MCNC: 3.3 MMOL/L — LOW (ref 3.5–5.3)
POTASSIUM SERPL-SCNC: 2.8 MMOL/L — CRITICAL LOW (ref 3.5–5.3)
POTASSIUM SERPL-SCNC: 3.1 MMOL/L — LOW (ref 3.5–5.3)
POTASSIUM SERPL-SCNC: 3.2 MMOL/L — LOW (ref 3.5–5.3)
POTASSIUM SERPL-SCNC: 3.3 MMOL/L — LOW (ref 3.5–5.3)
PROT SERPL-MCNC: 5.6 G/DL — LOW (ref 6–8.3)
PROT SERPL-MCNC: 5.7 G/DL — LOW (ref 6–8.3)
PROT SERPL-MCNC: 5.8 G/DL — LOW (ref 6–8.3)
PROT SERPL-MCNC: 5.9 G/DL — LOW (ref 6–8.3)
PROT UR-MCNC: ABNORMAL
PROT UR-MCNC: ABNORMAL
PROTHROM AB SERPL-ACNC: 14.5 SEC — HIGH (ref 10.5–13.4)
PROTHROM AB SERPL-ACNC: 14.5 SEC — HIGH (ref 10.5–13.4)
PROTHROM AB SERPL-ACNC: 14.6 SEC — HIGH (ref 10.5–13.4)
PROTHROM AB SERPL-ACNC: 14.7 SEC — HIGH (ref 10.5–13.4)
RBC # BLD: 2.49 M/UL — LOW (ref 4.2–5.8)
RBC # BLD: 2.57 M/UL — LOW (ref 4.2–5.8)
RBC # BLD: 2.75 M/UL — LOW (ref 4.2–5.8)
RBC # BLD: 2.82 M/UL — LOW (ref 4.2–5.8)
RBC # FLD: 13.9 % — SIGNIFICANT CHANGE UP (ref 10.3–14.5)
RBC # FLD: 14.2 % — SIGNIFICANT CHANGE UP (ref 10.3–14.5)
RBC # FLD: 14.6 % — HIGH (ref 10.3–14.5)
RBC # FLD: 14.6 % — HIGH (ref 10.3–14.5)
RBC BLD AUTO: SIGNIFICANT CHANGE UP
RBC CASTS # UR COMP ASSIST: 2 /HPF — SIGNIFICANT CHANGE UP (ref 0–4)
RBC CASTS # UR COMP ASSIST: 5 /HPF — HIGH (ref 0–4)
RH IG SCN BLD-IMP: POSITIVE — SIGNIFICANT CHANGE UP
SAO2 % BLDA: 100 % — HIGH (ref 94–98)
SAO2 % BLDA: 100 % — HIGH (ref 94–98)
SAO2 % BLDA: 99.9 % — HIGH (ref 94–98)
SARS-COV-2 RNA SPEC QL NAA+PROBE: SIGNIFICANT CHANGE UP
SODIUM SERPL-SCNC: 146 MMOL/L — HIGH (ref 135–145)
SODIUM SERPL-SCNC: 150 MMOL/L — HIGH (ref 135–145)
SODIUM SERPL-SCNC: 150 MMOL/L — HIGH (ref 135–145)
SODIUM SERPL-SCNC: 152 MMOL/L — HIGH (ref 135–145)
SP GR SPEC: 1.02 — SIGNIFICANT CHANGE UP (ref 1.01–1.02)
SP GR SPEC: 1.03 — HIGH (ref 1.01–1.02)
SP GR UR STRIP: 1.03 — HIGH (ref 1.01–1.02)
SP GR UR STRIP: 1.03 — HIGH (ref 1.01–1.02)
SPECIMEN SOURCE: SIGNIFICANT CHANGE UP
TROPONIN T, HIGH SENSITIVITY RESULT: 19 NG/L — SIGNIFICANT CHANGE UP (ref 0–51)
TROPONIN T, HIGH SENSITIVITY RESULT: 22 NG/L — SIGNIFICANT CHANGE UP (ref 0–51)
TROPONIN T, HIGH SENSITIVITY RESULT: 36 NG/L — SIGNIFICANT CHANGE UP (ref 0–51)
TROPONIN T, HIGH SENSITIVITY RESULT: 43 NG/L — SIGNIFICANT CHANGE UP (ref 0–51)
UROBILINOGEN FLD QL: ABNORMAL
UROBILINOGEN FLD QL: ABNORMAL
WBC # BLD: 10.78 K/UL — HIGH (ref 3.8–10.5)
WBC # BLD: 6.89 K/UL — SIGNIFICANT CHANGE UP (ref 3.8–10.5)
WBC # BLD: 8.07 K/UL — SIGNIFICANT CHANGE UP (ref 3.8–10.5)
WBC # BLD: 8.3 K/UL — SIGNIFICANT CHANGE UP (ref 3.8–10.5)
WBC # FLD AUTO: 10.78 K/UL — HIGH (ref 3.8–10.5)
WBC # FLD AUTO: 6.89 K/UL — SIGNIFICANT CHANGE UP (ref 3.8–10.5)
WBC # FLD AUTO: 8.07 K/UL — SIGNIFICANT CHANGE UP (ref 3.8–10.5)
WBC # FLD AUTO: 8.3 K/UL — SIGNIFICANT CHANGE UP (ref 3.8–10.5)
WBC UR QL: 12 /HPF — HIGH (ref 0–5)
WBC UR QL: 4 /HPF — SIGNIFICANT CHANGE UP (ref 0–5)

## 2022-02-28 PROCEDURE — 99222 1ST HOSP IP/OBS MODERATE 55: CPT | Mod: GC,25

## 2022-02-28 PROCEDURE — 71045 X-RAY EXAM CHEST 1 VIEW: CPT | Mod: 26

## 2022-02-28 PROCEDURE — 31645 BRNCHSC W/THER ASPIR 1ST: CPT | Mod: GC

## 2022-02-28 PROCEDURE — 74176 CT ABD & PELVIS W/O CONTRAST: CPT | Mod: 26

## 2022-02-28 PROCEDURE — 71250 CT THORAX DX C-: CPT | Mod: 26

## 2022-02-28 RX ORDER — PIPERACILLIN AND TAZOBACTAM 4; .5 G/20ML; G/20ML
3.38 INJECTION, POWDER, LYOPHILIZED, FOR SOLUTION INTRAVENOUS EVERY 6 HOURS
Refills: 0 | Status: DISCONTINUED | OUTPATIENT
Start: 2022-02-28 | End: 2022-02-28

## 2022-02-28 RX ORDER — DEXTROSE 50 % IN WATER 50 %
25 SYRINGE (ML) INTRAVENOUS
Refills: 0 | Status: DISCONTINUED | OUTPATIENT
Start: 2022-02-28 | End: 2022-02-27

## 2022-02-28 RX ORDER — LEVOTHYROXINE SODIUM 125 MCG
20 TABLET ORAL ONCE
Refills: 0 | Status: COMPLETED | OUTPATIENT
Start: 2022-02-28 | End: 2022-02-28

## 2022-02-28 RX ORDER — DEXTROSE 50 % IN WATER 50 %
25 SYRINGE (ML) INTRAVENOUS ONCE
Refills: 0 | Status: DISCONTINUED | OUTPATIENT
Start: 2022-02-28 | End: 2022-02-27

## 2022-02-28 RX ORDER — DEXTROSE 50 % IN WATER 50 %
50 SYRINGE (ML) INTRAVENOUS
Refills: 0 | Status: DISCONTINUED | OUTPATIENT
Start: 2022-02-28 | End: 2022-02-27

## 2022-02-28 RX ORDER — PIPERACILLIN AND TAZOBACTAM 4; .5 G/20ML; G/20ML
3.38 INJECTION, POWDER, LYOPHILIZED, FOR SOLUTION INTRAVENOUS ONCE
Refills: 0 | Status: COMPLETED | OUTPATIENT
Start: 2022-02-28 | End: 2022-02-28

## 2022-02-28 RX ORDER — CHLORHEXIDINE GLUCONATE 213 G/1000ML
1 SOLUTION TOPICAL
Refills: 0 | Status: DISCONTINUED | OUTPATIENT
Start: 2022-02-28 | End: 2022-02-27

## 2022-02-28 RX ORDER — LEVOTHYROXINE SODIUM 125 MCG
20 TABLET ORAL
Qty: 200 | Refills: 0 | Status: DISCONTINUED | OUTPATIENT
Start: 2022-02-28 | End: 2022-02-27

## 2022-02-28 RX ORDER — INSULIN HUMAN 100 [IU]/ML
3 INJECTION, SOLUTION SUBCUTANEOUS
Qty: 250 | Refills: 0 | Status: DISCONTINUED | OUTPATIENT
Start: 2022-02-28 | End: 2022-02-28

## 2022-02-28 RX ORDER — INSULIN LISPRO 100/ML
2 VIAL (ML) SUBCUTANEOUS ONCE
Refills: 0 | Status: COMPLETED | OUTPATIENT
Start: 2022-02-28 | End: 2022-02-28

## 2022-02-28 RX ORDER — IPRATROPIUM/ALBUTEROL SULFATE 18-103MCG
3 AEROSOL WITH ADAPTER (GRAM) INHALATION EVERY 6 HOURS
Refills: 0 | Status: DISCONTINUED | OUTPATIENT
Start: 2022-02-28 | End: 2022-02-27

## 2022-02-28 RX ORDER — VANCOMYCIN HCL 1 G
1000 VIAL (EA) INTRAVENOUS EVERY 12 HOURS
Refills: 0 | Status: DISCONTINUED | OUTPATIENT
Start: 2022-02-28 | End: 2022-02-27

## 2022-02-28 RX ORDER — INSULIN LISPRO 100/ML
VIAL (ML) SUBCUTANEOUS EVERY 6 HOURS
Refills: 0 | Status: DISCONTINUED | OUTPATIENT
Start: 2022-02-28 | End: 2022-02-28

## 2022-02-28 RX ORDER — INSULIN HUMAN 100 [IU]/ML
3 INJECTION, SOLUTION SUBCUTANEOUS
Qty: 100 | Refills: 0 | Status: DISCONTINUED | OUTPATIENT
Start: 2022-02-28 | End: 2022-02-27

## 2022-02-28 RX ORDER — POTASSIUM CHLORIDE 20 MEQ
10 PACKET (EA) ORAL
Refills: 0 | Status: COMPLETED | OUTPATIENT
Start: 2022-02-28 | End: 2022-02-28

## 2022-02-28 RX ORDER — CHLORHEXIDINE GLUCONATE 213 G/1000ML
15 SOLUTION TOPICAL EVERY 12 HOURS
Refills: 0 | Status: DISCONTINUED | OUTPATIENT
Start: 2022-02-28 | End: 2022-02-27

## 2022-02-28 RX ORDER — PIPERACILLIN AND TAZOBACTAM 4; .5 G/20ML; G/20ML
3.38 INJECTION, POWDER, LYOPHILIZED, FOR SOLUTION INTRAVENOUS EVERY 6 HOURS
Refills: 0 | Status: DISCONTINUED | OUTPATIENT
Start: 2022-02-28 | End: 2022-02-27

## 2022-02-28 RX ORDER — POTASSIUM CHLORIDE 20 MEQ
40 PACKET (EA) ORAL ONCE
Refills: 0 | Status: COMPLETED | OUTPATIENT
Start: 2022-02-28 | End: 2022-02-28

## 2022-02-28 RX ORDER — LANOLIN/MINERAL OIL
1 LOTION (ML) TOPICAL ONCE
Refills: 0 | Status: COMPLETED | OUTPATIENT
Start: 2022-02-28 | End: 2022-02-28

## 2022-02-28 RX ORDER — ACETYLCYSTEINE 200 MG/ML
4 VIAL (ML) MISCELLANEOUS EVERY 4 HOURS
Refills: 0 | Status: DISCONTINUED | OUTPATIENT
Start: 2022-02-28 | End: 2022-02-27

## 2022-02-28 RX ADMIN — Medication 1 MILLIGRAM(S): at 11:18

## 2022-02-28 RX ADMIN — CHLORHEXIDINE GLUCONATE 15 MILLILITER(S): 213 SOLUTION TOPICAL at 18:12

## 2022-02-28 RX ADMIN — PIPERACILLIN AND TAZOBACTAM 200 GRAM(S): 4; .5 INJECTION, POWDER, LYOPHILIZED, FOR SOLUTION INTRAVENOUS at 14:08

## 2022-02-28 RX ADMIN — Medication 100 MILLIEQUIVALENT(S): at 21:34

## 2022-02-28 RX ADMIN — Medication 2 DROP(S): at 16:25

## 2022-02-28 RX ADMIN — Medication 3 MILLILITER(S): at 13:27

## 2022-02-28 RX ADMIN — INSULIN HUMAN 3 UNIT(S)/HR: 100 INJECTION, SOLUTION SUBCUTANEOUS at 16:26

## 2022-02-28 RX ADMIN — Medication 250 MILLIGRAM(S): at 18:12

## 2022-02-28 RX ADMIN — Medication 5.89 MICROGRAM(S)/KG/MIN: at 16:25

## 2022-02-28 RX ADMIN — Medication 1 TABLET(S): at 11:18

## 2022-02-28 RX ADMIN — INSULIN HUMAN 3 UNIT(S)/HR: 100 INJECTION, SOLUTION SUBCUTANEOUS at 19:22

## 2022-02-28 RX ADMIN — Medication 25 MICROGRAM(S)/HR: at 03:08

## 2022-02-28 RX ADMIN — MUPIROCIN 1 APPLICATION(S): 20 OINTMENT TOPICAL at 18:13

## 2022-02-28 RX ADMIN — Medication 3 MILLILITER(S): at 23:42

## 2022-02-28 RX ADMIN — Medication 5.89 MICROGRAM(S)/KG/MIN: at 19:23

## 2022-02-28 RX ADMIN — CHLORHEXIDINE GLUCONATE 15 MILLILITER(S): 213 SOLUTION TOPICAL at 05:48

## 2022-02-28 RX ADMIN — Medication 20 MICROGRAM(S): at 02:42

## 2022-02-28 RX ADMIN — Medication 3 MILLILITER(S): at 18:17

## 2022-02-28 RX ADMIN — Medication 64.8 MILLIGRAM(S): at 05:48

## 2022-02-28 RX ADMIN — Medication 2 DROP(S): at 14:09

## 2022-02-28 RX ADMIN — BROMOCRIPTINE MESYLATE 5 MILLIGRAM(S): 5 CAPSULE ORAL at 14:09

## 2022-02-28 RX ADMIN — Medication 2 UNIT(S): at 01:35

## 2022-02-28 RX ADMIN — Medication 3 MILLILITER(S): at 05:44

## 2022-02-28 RX ADMIN — Medication 2 DROP(S): at 21:34

## 2022-02-28 RX ADMIN — Medication 105 MILLIGRAM(S): at 11:18

## 2022-02-28 RX ADMIN — Medication 58 MILLIGRAM(S): at 05:48

## 2022-02-28 RX ADMIN — BROMOCRIPTINE MESYLATE 5 MILLIGRAM(S): 5 CAPSULE ORAL at 06:01

## 2022-02-28 RX ADMIN — Medication 100 MILLIEQUIVALENT(S): at 20:50

## 2022-02-28 RX ADMIN — VASOPRESSIN 2.4 UNIT(S)/MIN: 20 INJECTION INTRAVENOUS at 19:22

## 2022-02-28 RX ADMIN — CHLORHEXIDINE GLUCONATE 1 APPLICATION(S): 213 SOLUTION TOPICAL at 21:34

## 2022-02-28 RX ADMIN — Medication 2 DROP(S): at 04:26

## 2022-02-28 RX ADMIN — Medication 4: at 05:47

## 2022-02-28 RX ADMIN — Medication 2 DROP(S): at 02:04

## 2022-02-28 RX ADMIN — Medication 2 DROP(S): at 05:47

## 2022-02-28 RX ADMIN — PIPERACILLIN AND TAZOBACTAM 200 GRAM(S): 4; .5 INJECTION, POWDER, LYOPHILIZED, FOR SOLUTION INTRAVENOUS at 02:43

## 2022-02-28 RX ADMIN — Medication 25 MICROGRAM(S)/HR: at 16:26

## 2022-02-28 RX ADMIN — Medication 1 APPLICATION(S): at 15:49

## 2022-02-28 RX ADMIN — MUPIROCIN 1 APPLICATION(S): 20 OINTMENT TOPICAL at 05:48

## 2022-02-28 RX ADMIN — PIPERACILLIN AND TAZOBACTAM 200 GRAM(S): 4; .5 INJECTION, POWDER, LYOPHILIZED, FOR SOLUTION INTRAVENOUS at 09:00

## 2022-02-28 RX ADMIN — Medication 40 MILLIEQUIVALENT(S): at 20:58

## 2022-02-28 RX ADMIN — Medication 2 DROP(S): at 20:03

## 2022-02-28 RX ADMIN — Medication 2 DROP(S): at 11:19

## 2022-02-28 RX ADMIN — PIPERACILLIN AND TAZOBACTAM 200 GRAM(S): 4; .5 INJECTION, POWDER, LYOPHILIZED, FOR SOLUTION INTRAVENOUS at 20:03

## 2022-02-28 RX ADMIN — VASOPRESSIN 2.4 UNIT(S)/MIN: 20 INJECTION INTRAVENOUS at 16:27

## 2022-02-28 RX ADMIN — Medication 2 DROP(S): at 10:06

## 2022-02-28 RX ADMIN — Medication 2 DROP(S): at 09:01

## 2022-02-28 RX ADMIN — Medication 2 DROP(S): at 18:12

## 2022-02-28 RX ADMIN — Medication 25 MICROGRAM(S)/HR: at 19:23

## 2022-02-28 RX ADMIN — PANTOPRAZOLE SODIUM 40 MILLIGRAM(S): 20 TABLET, DELAYED RELEASE ORAL at 11:18

## 2022-02-28 RX ADMIN — Medication 100 MILLIEQUIVALENT(S): at 19:33

## 2022-02-28 NOTE — PROCEDURE NOTE - NSBRONCHFINDINGS_GEN_A_CORE_FT
ETT noted to be in good position. Airway evaluation revealed normal mucosa without erythema or edema. No secretions in Left lung. Minimal thin clear secretions in superior segment of RLL which were aspirated and cleared. BAL done of RUL. Airways left clear prior to bronchoscope being removed. Pt tolerated procedure well.

## 2022-02-28 NOTE — PROCEDURE NOTE - NSBRONCHPROCDETAILS_GEN_A_CORE_FT
Bronchoscope inserted through ETT. ETT noted to be in good position. Bilateral airways serially inspected.   Clear mucous aspirated and lung segments cleaned. BAL taken from RUL.  Bronchoscope then withdrawn from ETT. Bronchoscope inserted through ETT. ETT noted to be in good position above the mariaa. Bilateral airways serially inspected without evidence of endobronchial lesion or mass. Thin clear mucous aspirated and lung segments cleaned. BAL taken from RUL.  Bronchoscope then withdrawn from ETT.

## 2022-02-28 NOTE — PROCEDURE NOTE - SUPERVISORY STATEMENT
Therapeutic aspiration of secretions and BAL performed for analysis as part of [facility] process to evaluate lungs for potential transfusion.  No immediate complications.  Discussed with daughter at bedside prior to procedure and update provided post procedure.

## 2022-02-28 NOTE — CONSULT NOTE ADULT - ASSESSMENT
45YO Male PMH recent heavy EtOH use, HTN, possible ASA use, HLD and DM II was taken to [facility] 2/25/22 for vomiting since 2/24/22 PM unwitnessed fall with resultant altered mental status. Pulmonary consulted for bronchoscopy with BAL for Donor lung evaluation and sampling.     - S/p Bedside bronchoscopy with BAL of RUL  - See bronch note  - F/u Cultures and further testing    Pulmonary to sign off please call with questions.    Case seen and discussed with Dr. Franks 45YO Male PMH recent heavy EtOH use, HTN, possible ASA use, HLD and DM II was taken to [facility] 2/25/22 for vomiting since 2/24/22 PM unwitnessed fall with resultant altered mental status. Deemed to have undergone brain death on 2/27/2022. Pulmonary consulted for bronchoscopy with BAL for Donor lung evaluation and sampling.     - S/p Bedside bronchoscopy with BAL of RUL  - See bronch note  - F/u Cultures and further testing    Pulmonary to sign off please call with questions.    Case seen and discussed with Dr. Tiny Ferguson, DO PGY-5  Pulmonary/Critical Care Fellow   Pager: [phone] (BRIANNA), [phone] (NS)  Pulmonary Spectra #[phone] (NS) / [phone] (BRIANNA)

## 2022-02-28 NOTE — PROCEDURE NOTE - NSBRONCHHISTORY_GEN_A_CORE_FT
45YO Male PMH recent heavy EtOH use, HTN, possible ASA use, HLD and DM II was taken to [facility] 2/25/22 for vomiting since 2/24/22 PM unwitnessed fall with resultant altered mental status. Declared brain dead. Pulmonary consulted for bronchoscopy with BAL for Donor lung evaluation and sampling.

## 2022-02-28 NOTE — CONSULT NOTE ADULT - SUBJECTIVE AND OBJECTIVE BOX
CHIEF COMPLAINT:  Found down    HPI:  45YO Male PMH recent heavy EtOH use, HTN, possible ASA use, HLD and DM II was taken to [facility] 22 for vomiting since 22 PM unwitnessed fall with resultant altered mental status.   Patient has unwitnessed fall with posterior head bump per [facility] chart notes. CT was performed and shows bilateral frontal and Left temporal contusions. Pt intubated by ED for decreasing GCS and agitation, placed on keppra. Pt had attempted EVD, multiple attempts were made, but unable to be placed successfully so bolt placed for ICP monitoring. Pt ICPs in 50-70s and given 23.4% and 60g mannitol. Placed on propofol sedation. Pt noted to be febrile and placed on Arctic sun. Continued hyperglycemia with acidosis/respiratory compensation, placed on Insulin gtt. Given persistent increased ICPStarted on norepinephrine gtt in attempt to optimize CPP given refractory ICP. Patient had repeat CT performed in unit, showing increased hemorrhage, midline shift and compression of ventricles. Pt was brought to OR for Left Hemicraniectomy for Decompression. Post op CT showed Successful hemicraniectomy and extensive multifocal infarct with poor postop exam. Prognosis discussed with Family and decision to move forward with Organ donation through [facility] was continued. On 22, An Apnea test was performed and the patient was deemed to have undergone brain death at 17:42 due to official reading of ABG PH: 7.24 Pco2: 70 Po2: 162 HCO3: 30 from   PAST MEDICAL & SURGICAL HISTORY:      FAMILY HISTORY:      SOCIAL HISTORY:  Smoking: [ ] Never Smoked [ ] Former Smoker (__ packs x ___ years) [ ] Current Smoker  (__ packs x ___ years)  Substance Use: [ ] Never Used [ ] Used ____  EtOH Use:  Marital Status: [ ] Single [ ]  [ ]  [ ]   Sexual History:   Occupation:  Recent Travel:  Country of Birth:  Advance Directives:    Allergies    No Known Allergies    Intolerances        HOME MEDICATIONS:  Home Medications:  aspirin 81 mg oral tablet, chewable: 1 tab(s) orally once a day (2022 14:52)  atorvastatin 20 mg oral tablet: 1 tab(s) orally once a day (2022 14:52)  lisinopril 2.5 mg oral tablet: 1 tab(s) orally once a day (2022 14:52)  metFORMIN 850 mg oral tablet: 1 tab(s) orally once a day (in the morning) (2022 14:52)      REVIEW OF SYSTEMS:  Constitutional: [ ] negative [ ] fevers [ ] chills [ ] weight loss [ ] weight gain  HEENT: [ ] negative [ ] dry eyes [ ] eye irritation [ ] postnasal drip [ ] nasal congestion  CV: [ ] negative  [ ] chest pain [ ] orthopnea [ ] palpitations [ ] murmur  Resp: [ ] negative [ ] cough [ ] shortness of breath [ ] dyspnea [ ] wheezing [ ] sputum [ ] hemoptysis  GI: [ ] negative [ ] nausea [ ] vomiting [ ] diarrhea [ ] constipation [ ] abd pain [ ] dysphagia   : [ ] negative [ ] dysuria [ ] nocturia [ ] hematuria [ ] increased urinary frequency  Musculoskeletal: [ ] negative [ ] back pain [ ] myalgias [ ] arthralgias [ ] fracture  Skin: [ ] negative [ ] rash [ ] itch  Neurological: [ ] negative [ ] headache [ ] dizziness [ ] syncope [ ] weakness [ ] numbness  Psychiatric: [ ] negative [ ] anxiety [ ] depression  Endocrine: [ ] negative [ ] diabetes [ ] thyroid problem  Hematologic/Lymphatic: [ ] negative [ ] anemia [ ] bleeding problem  Allergic/Immunologic: [ ] negative [ ] itchy eyes [ ] nasal discharge [ ] hives [ ] angioedema  [ ] All other systems negative  [ ] Unable to assess ROS because ________    OBJECTIVE:  ICU Vital Signs Last 24 Hrs  T(C): 35.5 (2022 10:00), Max: 37.2 (2022 02:00)  T(F): 95.9 (2022 10:00), Max: 99 (2022 02:00)  HR: 77 (2022 10:00) (64 - 107)  BP: --  BP(mean): --  ABP: 108/57 (2022 10:00) (108/57 - 168/87)  ABP(mean): 78 (2022 10:00) (75 - 118)  RR: 16 (2022 10:00) (14 - 17)  SpO2: 100% (2022 10:00) (98% - 100%)    Mode: AC/ CMV (Assist Control/ Continuous Mandatory Ventilation), RR (machine): 16, TV (machine): 450, FiO2: 30, PEEP: 5, ITime: 1, MAP: 9, PIP: 20     @ 07: @ 07:00  --------------------------------------------------------  IN: 3064.5 mL / OUT: 1375 mL / NET: 1689.5 mL     @ 07: @ 10:08  --------------------------------------------------------  IN: 101.2 mL / OUT: 140 mL / NET: -38.8 mL      CAPILLARY BLOOD GLUCOSE      POCT Blood Glucose.: 225 mg/dL (2022 05:45)      PHYSICAL EXAM:  General:   HEENT:   Lymph Nodes:  Neck:   Respiratory:   Cardiovascular:   Abdomen:   Extremities:   Skin:   Neurological:  Psychiatry:    LINES:     HOSPITAL MEDICATIONS:  Standing Meds:  albuterol/ipratropium for Nebulization 3 milliLiter(s) Nebulizer every 6 hours  artificial tears (preservative free) Ophthalmic Solution 2 Drop(s) Both EYES every 2 hours  bromocriptine Capsule 5 milliGRAM(s) Oral every 8 hours  chlorhexidine 0.12% Liquid 15 milliLiter(s) Oral Mucosa every 12 hours  chlorhexidine 4% Liquid 1 Application(s) Topical <User Schedule>  dextrose 50% Injectable 25 Gram(s) IV Push once  folic acid 1 milliGRAM(s) Oral daily  insulin lispro (ADMELOG) corrective regimen sliding scale   SubCutaneous every 6 hours  levothyroxine Infusion 10 MICROgram(s)/Hr IV Continuous <Continuous>  methylPREDNISolone sodium succinate IVPB 1000 milliGRAM(s) IV Intermittent daily  multivitamin 1 Tablet(s) Oral daily  mupirocin 2% Nasal 1 Application(s) Both Nostrils every 12 hours  norepinephrine Infusion 0.05 MICROgram(s)/kG/Min IV Continuous <Continuous>  pantoprazole  Injectable 40 milliGRAM(s) IV Push daily  PHENobarbital 64.8 milliGRAM(s) Oral every 12 hours  phenylephrine    Infusion 0.4 MICROgram(s)/kG/Min IV Continuous <Continuous>  piperacillin/tazobactam IVPB.. 3.375 Gram(s) IV Intermittent every 6 hours  thiamine IVPB 500 milliGRAM(s) IV Intermittent daily  vasopressin Infusion 0.04 Unit(s)/Min IV Continuous <Continuous>      PRN Meds:  acetylcysteine 10%  Inhalation 4 milliLiter(s) Inhalation every 4 hours PRN      LABS:                        8.8    8.07  )-----------( 75       ( 2022 08:27 )             26.8     Hgb Trend: 8.8<--, 9.1<--, 9.6<--, 10.7<--, 11.4<--      150<H>  |  114<H>  |  9   ----------------------------<  232<H>  3.2<L>   |  22  |  0.35<L>    Ca    8.2<L>      2022 08:27  Phos  2.7       Mg     1.9         TPro  5.9<L>  /  Alb  2.8<L>  /  TBili  3.8<H>  /  DBili  2.2<H>  /  AST  53<H>  /  ALT  52<H>  /  AlkPhos  237<H>      Creatinine Trend: 0.35<--, 0.38<--, 0.33<--, 0.43<--, 0.43<--, 0.38<--  PT/INR - ( 2022 08:27 )   PT: 14.5 sec;   INR: 1.21 ratio         PTT - ( 2022 08:27 )  PTT:31.8 sec  Urinalysis Basic - ( 2022 08:28 )    Color: Yellow / Appearance: Clear / S.031 / pH: x  Gluc: x / Ketone: Large  / Bili: Small / Urobili: 4 mg/dL   Blood: x / Protein: Trace / Nitrite: Negative   Leuk Esterase: Negative / RBC: x / WBC x   Sq Epi: x / Non Sq Epi: x / Bacteria: x      Arterial Blood Gas:   @ 08:15  7.44/38/410/26/100.0/1.7  ABG lactate: --  Arterial Blood Gas:   @ 02:27  7.37/40/433/23/100.0/-2.0  ABG lactate: --  Arterial Blood Gas:   @ 20:19  7.42/42/165/27/99.7/2.4  ABG lactate: --  Arterial Blood Gas:   @ 17:58  7.20/76/75/30/96.1/-0.4  ABG lactate: --  Arterial Blood Gas:   @ 17:45  7.24/70/162/30/99.2/0.7  ABG lactate: --  Arterial Blood Gas:   @ 17:32  7.41/42/441/27/100.0/1.7  ABG lactate: --  Arterial Blood Gas:   @ 16:40  7.41/43/156/27/99.4/2.3  ABG lactate: --  Arterial Blood Gas:   @ 14:11  7.37/47/155/27/99.5/1.5  ABG lactate: --  Arterial Blood Gas:   @ 05:36  7.39/45/146/27/99.7/1.9  ABG lactate: --  Arterial Blood Gas:   @ 04:11  7.34/52/131/28/99.2/1.6  ABG lactate: --  Arterial Blood Gas:   @ 22:19  7.33/46/140/24/99.7/-1.8  ABG lactate: --  Arterial Blood Gas:   @ 15:49  7.37/42/171/24/99.8/-1.0  ABG lactate: --        MICROBIOLOGY:     Culture - Blood (collected 2022 14:37)  Source: .Blood Blood  Preliminary Report (2022 15:05):    No growth to date.    Culture - Blood (collected 2022 14:37)  Source: .Blood Blood  Preliminary Report (2022 15:05):    No growth to date.        RADIOLOGY:  [ ] Reviewed and interpreted by me    PULMONARY FUNCTION TESTS:    EKG: CHIEF COMPLAINT:  Found down    HPI:  45YO Male PMH recent heavy EtOH use, HTN, possible ASA use, HLD and DM II was taken to [facility] 22 for vomiting since 22 PM unwitnessed fall with resultant altered mental status.   Patient has unwitnessed fall with posterior head bump per [facility] chart notes. CT was performed and shows bilateral frontal and Left temporal contusions. Pt intubated by ED for decreasing GCS and agitation, placed on keppra. Pt had attempted EVD, multiple attempts were made, but unable to be placed successfully so bolt placed for ICP monitoring. Pt ICPs in 50-70s and given 23.4% and 60g mannitol. Placed on propofol sedation. Pt noted to be febrile and placed on Arctic sun. Continued hyperglycemia with acidosis/respiratory compensation, placed on Insulin gtt. Given persistent increased ICPStarted on norepinephrine gtt in attempt to optimize CPP given refractory ICP. Patient had repeat CT performed in unit, showing increased hemorrhage, midline shift and compression of ventricles. Pt was brought to OR for Left Hemicraniectomy for Decompression. Post op CT showed Successful hemicraniectomy and extensive multifocal infarct with poor postop exam. Prognosis discussed with Family and decision to move forward with Organ donation through [facility] was continued. On 22, An Apnea test was performed and the patient was deemed to have undergone brain death at 17:42 due to official reading of ABG 7.24/70/162 from 7.41/42/41.    Pulmonary consulted for bronchoscopy with BAL for Donor lung evaluation and sampling.       PAST MEDICAL & SURGICAL HISTORY:      FAMILY HISTORY:      SOCIAL HISTORY:  Smoking: [ ] Never Smoked [ ] Former Smoker (__ packs x ___ years) [ ] Current Smoker  (__ packs x ___ years)  Substance Use: [ ] Never Used [ ] Used ____  EtOH Use:  Marital Status: [ ] Single [ ]  [ ]  [ ]   Sexual History:   Occupation:  Recent Travel:  Country of Birth:  Advance Directives:    Allergies    No Known Allergies    Intolerances        HOME MEDICATIONS:  Home Medications:  aspirin 81 mg oral tablet, chewable: 1 tab(s) orally once a day (2022 14:52)  atorvastatin 20 mg oral tablet: 1 tab(s) orally once a day (2022 14:52)  lisinopril 2.5 mg oral tablet: 1 tab(s) orally once a day (2022 14:52)  metFORMIN 850 mg oral tablet: 1 tab(s) orally once a day (in the morning) (2022 14:52)      REVIEW OF SYSTEMS:  Constitutional: [ ] negative [ ] fevers [ ] chills [ ] weight loss [ ] weight gain  HEENT: [ ] negative [ ] dry eyes [ ] eye irritation [ ] postnasal drip [ ] nasal congestion  CV: [ ] negative  [ ] chest pain [ ] orthopnea [ ] palpitations [ ] murmur  Resp: [ ] negative [ ] cough [ ] shortness of breath [ ] dyspnea [ ] wheezing [ ] sputum [ ] hemoptysis  GI: [ ] negative [ ] nausea [ ] vomiting [ ] diarrhea [ ] constipation [ ] abd pain [ ] dysphagia   : [ ] negative [ ] dysuria [ ] nocturia [ ] hematuria [ ] increased urinary frequency  Musculoskeletal: [ ] negative [ ] back pain [ ] myalgias [ ] arthralgias [ ] fracture  Skin: [ ] negative [ ] rash [ ] itch  Neurological: [ ] negative [ ] headache [ ] dizziness [ ] syncope [ ] weakness [ ] numbness  Psychiatric: [ ] negative [ ] anxiety [ ] depression  Endocrine: [ ] negative [ ] diabetes [ ] thyroid problem  Hematologic/Lymphatic: [ ] negative [ ] anemia [ ] bleeding problem  Allergic/Immunologic: [ ] negative [ ] itchy eyes [ ] nasal discharge [ ] hives [ ] angioedema  [ ] All other systems negative  [ ] Unable to assess ROS because ________    OBJECTIVE:  ICU Vital Signs Last 24 Hrs  T(C): 35.5 (2022 10:00), Max: 37.2 (2022 02:00)  T(F): 95.9 (2022 10:00), Max: 99 (2022 02:00)  HR: 77 (2022 10:00) (64 - 107)  BP: --  BP(mean): --  ABP: 108/57 (2022 10:00) (108/57 - 168/87)  ABP(mean): 78 (2022 10:00) (75 - 118)  RR: 16 (2022 10:00) (14 - 17)  SpO2: 100% (2022 10:00) (98% - 100%)    Mode: AC/ CMV (Assist Control/ Continuous Mandatory Ventilation), RR (machine): 16, TV (machine): 450, FiO2: 30, PEEP: 5, ITime: 1, MAP: 9, PIP: 20     @ 07:01   @ 07:00  --------------------------------------------------------  IN: 3064.5 mL / OUT: 1375 mL / NET: 1689.5 mL     @ 07: @ 10:08  --------------------------------------------------------  IN: 101.2 mL / OUT: 140 mL / NET: -38.8 mL      CAPILLARY BLOOD GLUCOSE      POCT Blood Glucose.: 225 mg/dL (2022 05:45)      PHYSICAL EXAM:  General:   HEENT:   Lymph Nodes:  Neck:   Respiratory:   Cardiovascular:   Abdomen:   Extremities:   Skin:   Neurological:  Psychiatry:    LINES:     HOSPITAL MEDICATIONS:  Standing Meds:  albuterol/ipratropium for Nebulization 3 milliLiter(s) Nebulizer every 6 hours  artificial tears (preservative free) Ophthalmic Solution 2 Drop(s) Both EYES every 2 hours  bromocriptine Capsule 5 milliGRAM(s) Oral every 8 hours  chlorhexidine 0.12% Liquid 15 milliLiter(s) Oral Mucosa every 12 hours  chlorhexidine 4% Liquid 1 Application(s) Topical <User Schedule>  dextrose 50% Injectable 25 Gram(s) IV Push once  folic acid 1 milliGRAM(s) Oral daily  insulin lispro (ADMELOG) corrective regimen sliding scale   SubCutaneous every 6 hours  levothyroxine Infusion 10 MICROgram(s)/Hr IV Continuous <Continuous>  methylPREDNISolone sodium succinate IVPB 1000 milliGRAM(s) IV Intermittent daily  multivitamin 1 Tablet(s) Oral daily  mupirocin 2% Nasal 1 Application(s) Both Nostrils every 12 hours  norepinephrine Infusion 0.05 MICROgram(s)/kG/Min IV Continuous <Continuous>  pantoprazole  Injectable 40 milliGRAM(s) IV Push daily  PHENobarbital 64.8 milliGRAM(s) Oral every 12 hours  phenylephrine    Infusion 0.4 MICROgram(s)/kG/Min IV Continuous <Continuous>  piperacillin/tazobactam IVPB.. 3.375 Gram(s) IV Intermittent every 6 hours  thiamine IVPB 500 milliGRAM(s) IV Intermittent daily  vasopressin Infusion 0.04 Unit(s)/Min IV Continuous <Continuous>      PRN Meds:  acetylcysteine 10%  Inhalation 4 milliLiter(s) Inhalation every 4 hours PRN      LABS:                        8.8    8.07  )-----------( 75       ( 2022 08:27 )             26.8     Hgb Trend: 8.8<--, 9.1<--, 9.6<--, 10.7<--, 11.4<--      150<H>  |  114<H>  |  9   ----------------------------<  232<H>  3.2<L>   |  22  |  0.35<L>    Ca    8.2<L>      2022 08:27  Phos  2.7       Mg     1.9         TPro  5.9<L>  /  Alb  2.8<L>  /  TBili  3.8<H>  /  DBili  2.2<H>  /  AST  53<H>  /  ALT  52<H>  /  AlkPhos  237<H>      Creatinine Trend: 0.35<--, 0.38<--, 0.33<--, 0.43<--, 0.43<--, 0.38<--  PT/INR - ( 2022 08:27 )   PT: 14.5 sec;   INR: 1.21 ratio         PTT - ( 2022 08:27 )  PTT:31.8 sec  Urinalysis Basic - ( 2022 08:28 )    Color: Yellow / Appearance: Clear / S.031 / pH: x  Gluc: x / Ketone: Large  / Bili: Small / Urobili: 4 mg/dL   Blood: x / Protein: Trace / Nitrite: Negative   Leuk Esterase: Negative / RBC: x / WBC x   Sq Epi: x / Non Sq Epi: x / Bacteria: x      Arterial Blood Gas:   @ 08:15  7.44/38/410/26/100.0/1.7  ABG lactate: --  Arterial Blood Gas:   @ 02:27  7.37/40/433/23/100.0/-2.0  ABG lactate: --  Arterial Blood Gas:   @ 20:19  7.42/42/165/27/99.7/2.4  ABG lactate: --  Arterial Blood Gas:   @ 17:58  7.20/76/75/30/96.1/-0.4  ABG lactate: --  Arterial Blood Gas:   @ 17:45  7.24/70/162/30/99.2/0.7  ABG lactate: --  Arterial Blood Gas:   @ 17:32  7.41/42/441/27/100.0/1.7  ABG lactate: --  Arterial Blood Gas:   @ 16:40  7.41/43/156/27/99.4/2.3  ABG lactate: --  Arterial Blood Gas:   @ 14:11  7.37/47/155/27/99.5/1.5  ABG lactate: --  Arterial Blood Gas:   @ 05:36  7.39/45/146/27/99.7/1.9  ABG lactate: --  Arterial Blood Gas:   @ 04:11  7.34/52/131/28/99.2/1.6  ABG lactate: --  Arterial Blood Gas:   @ 22:19  7.33/46/140/24/99.7/-1.8  ABG lactate: --  Arterial Blood Gas:   @ 15:49  7.37/42/171/24/99.8/-1.0  ABG lactate: --        MICROBIOLOGY:     Culture - Blood (collected 2022 14:37)  Source: .Blood Blood  Preliminary Report (2022 15:05):    No growth to date.    Culture - Blood (collected 2022 14:37)  Source: .Blood Blood  Preliminary Report (2022 15:05):    No growth to date.        RADIOLOGY:  [ ] Reviewed and interpreted by me    PULMONARY FUNCTION TESTS:    EKG: CHIEF COMPLAINT:  Found down    HPI:  45YO Male PMH recent heavy EtOH use, HTN, possible ASA use, HLD and DM II was taken to [facility] 22 for vomiting since 22 PM unwitnessed fall with resultant altered mental status.   Patient has unwitnessed fall with posterior head bump per [facility] chart notes. CT was performed and shows bilateral frontal and Left temporal contusions. Pt intubated by ED for decreasing GCS and agitation, placed on keppra. Pt had attempted EVD, multiple attempts were made, but unable to be placed successfully so bolt placed for ICP monitoring. Pt ICPs in 50-70s and given 23.4% and 60g mannitol. Placed on propofol sedation. Pt noted to be febrile and placed on Arctic sun. Continued hyperglycemia with acidosis/respiratory compensation, placed on Insulin gtt. Given persistent increased ICPStarted on norepinephrine gtt in attempt to optimize CPP given refractory ICP. Patient had repeat CT performed in unit, showing increased hemorrhage, midline shift and compression of ventricles. Pt was brought to OR for Left Hemicraniectomy for Decompression. Post op CT showed Successful hemicraniectomy and extensive multifocal infarct with poor postop exam. Prognosis discussed with Family and decision to move forward with Organ donation through [facility] was continued. On 22, An Apnea test was performed and the patient was deemed to have undergone brain death at 17:42 due to official reading of ABG 7.24/70/162 from 7.41/42/41.    Pulmonary consulted for bronchoscopy with BAL for Donor lung evaluation and sampling.       PAST MEDICAL & SURGICAL HISTORY:      FAMILY HISTORY:      SOCIAL HISTORY:  Smoking: [ ] Never Smoked [ ] Former Smoker (__ packs x ___ years) [ ] Current Smoker  (__ packs x ___ years)  Substance Use: [ ] Never Used [ ] Used ____  EtOH Use:  Marital Status: [ ] Single [ ]  [ ]  [ ]   Sexual History:   Occupation:  Recent Travel:  Country of Birth:  Advance Directives:    Allergies    No Known Allergies    Intolerances        HOME MEDICATIONS:  Home Medications:  aspirin 81 mg oral tablet, chewable: 1 tab(s) orally once a day (2022 14:52)  atorvastatin 20 mg oral tablet: 1 tab(s) orally once a day (2022 14:52)  lisinopril 2.5 mg oral tablet: 1 tab(s) orally once a day (2022 14:52)  metFORMIN 850 mg oral tablet: 1 tab(s) orally once a day (in the morning) (2022 14:52)      REVIEW OF SYSTEMS:  Constitutional: [ ] negative [ ] fevers [ ] chills [ ] weight loss [ ] weight gain  HEENT: [ ] negative [ ] dry eyes [ ] eye irritation [ ] postnasal drip [ ] nasal congestion  CV: [ ] negative  [ ] chest pain [ ] orthopnea [ ] palpitations [ ] murmur  Resp: [ ] negative [ ] cough [ ] shortness of breath [ ] dyspnea [ ] wheezing [ ] sputum [ ] hemoptysis  GI: [ ] negative [ ] nausea [ ] vomiting [ ] diarrhea [ ] constipation [ ] abd pain [ ] dysphagia   : [ ] negative [ ] dysuria [ ] nocturia [ ] hematuria [ ] increased urinary frequency  Musculoskeletal: [ ] negative [ ] back pain [ ] myalgias [ ] arthralgias [ ] fracture  Skin: [ ] negative [ ] rash [ ] itch  Neurological: [ ] negative [ ] headache [ ] dizziness [ ] syncope [ ] weakness [ ] numbness  Psychiatric: [ ] negative [ ] anxiety [ ] depression  Endocrine: [ ] negative [ ] diabetes [ ] thyroid problem  Hematologic/Lymphatic: [ ] negative [ ] anemia [ ] bleeding problem  Allergic/Immunologic: [ ] negative [ ] itchy eyes [ ] nasal discharge [ ] hives [ ] angioedema  [ ] All other systems negative  [ ] Unable to assess ROS because ________    OBJECTIVE:  ICU Vital Signs Last 24 Hrs  T(C): 35.5 (2022 10:00), Max: 37.2 (2022 02:00)  T(F): 95.9 (2022 10:00), Max: 99 (2022 02:00)  HR: 77 (2022 10:00) (64 - 107)  BP: --  BP(mean): --  ABP: 108/57 (2022 10:00) (108/57 - 168/87)  ABP(mean): 78 (2022 10:00) (75 - 118)  RR: 16 (2022 10:00) (14 - 17)  SpO2: 100% (2022 10:00) (98% - 100%)    Mode: AC/ CMV (Assist Control/ Continuous Mandatory Ventilation), RR (machine): 16, TV (machine): 450, FiO2: 30, PEEP: 5, ITime: 1, MAP: 9, PIP: 20     @ 07:01  -   @ 07:00  --------------------------------------------------------  IN: 3064.5 mL / OUT: 1375 mL / NET: 1689.5 mL     @ 07: @ 10:08  --------------------------------------------------------  IN: 101.2 mL / OUT: 140 mL / NET: -38.8 mL      CAPILLARY BLOOD GLUCOSE      POCT Blood Glucose.: 225 mg/dL (2022 05:45)      PHYSICAL EXAM:  General: No response, Brain death   HEENT: No pupillary reflex, staples over prior surgical site  Neck: no JVD   Respiratory: full mechanical ventilatory support - no spontaneous breaths, CTA bilaterally  Cardiovascular: RRR, S1S2  Abdomen: soft, ND  Extremities: no clubbing   Neurological: Brain death protocol completed - no movement or response    LINES:     HOSPITAL MEDICATIONS:  Standing Meds:  albuterol/ipratropium for Nebulization 3 milliLiter(s) Nebulizer every 6 hours  artificial tears (preservative free) Ophthalmic Solution 2 Drop(s) Both EYES every 2 hours  bromocriptine Capsule 5 milliGRAM(s) Oral every 8 hours  chlorhexidine 0.12% Liquid 15 milliLiter(s) Oral Mucosa every 12 hours  chlorhexidine 4% Liquid 1 Application(s) Topical <User Schedule>  dextrose 50% Injectable 25 Gram(s) IV Push once  folic acid 1 milliGRAM(s) Oral daily  insulin lispro (ADMELOG) corrective regimen sliding scale   SubCutaneous every 6 hours  levothyroxine Infusion 10 MICROgram(s)/Hr IV Continuous <Continuous>  methylPREDNISolone sodium succinate IVPB 1000 milliGRAM(s) IV Intermittent daily  multivitamin 1 Tablet(s) Oral daily  mupirocin 2% Nasal 1 Application(s) Both Nostrils every 12 hours  norepinephrine Infusion 0.05 MICROgram(s)/kG/Min IV Continuous <Continuous>  pantoprazole  Injectable 40 milliGRAM(s) IV Push daily  PHENobarbital 64.8 milliGRAM(s) Oral every 12 hours  phenylephrine    Infusion 0.4 MICROgram(s)/kG/Min IV Continuous <Continuous>  piperacillin/tazobactam IVPB.. 3.375 Gram(s) IV Intermittent every 6 hours  thiamine IVPB 500 milliGRAM(s) IV Intermittent daily  vasopressin Infusion 0.04 Unit(s)/Min IV Continuous <Continuous>      PRN Meds:  acetylcysteine 10%  Inhalation 4 milliLiter(s) Inhalation every 4 hours PRN      LABS:                        8.8    8.07  )-----------( 75       ( 2022 08:27 )             26.8     Hgb Trend: 8.8<--, 9.1<--, 9.6<--, 10.7<--, 11.4<--      150<H>  |  114<H>  |  9   ----------------------------<  232<H>  3.2<L>   |  22  |  0.35<L>    Ca    8.2<L>      2022 08:27  Phos  2.7       Mg     1.9         TPro  5.9<L>  /  Alb  2.8<L>  /  TBili  3.8<H>  /  DBili  2.2<H>  /  AST  53<H>  /  ALT  52<H>  /  AlkPhos  237<H>      Creatinine Trend: 0.35<--, 0.38<--, 0.33<--, 0.43<--, 0.43<--, 0.38<--  PT/INR - ( 2022 08:27 )   PT: 14.5 sec;   INR: 1.21 ratio         PTT - ( 2022 08:27 )  PTT:31.8 sec  Urinalysis Basic - ( 2022 08:28 )    Color: Yellow / Appearance: Clear / S.031 / pH: x  Gluc: x / Ketone: Large  / Bili: Small / Urobili: 4 mg/dL   Blood: x / Protein: Trace / Nitrite: Negative   Leuk Esterase: Negative / RBC: x / WBC x   Sq Epi: x / Non Sq Epi: x / Bacteria: x      Arterial Blood Gas:   @ 08:15  7.44/38/410/26/100.0/1.7  ABG lactate: --  Arterial Blood Gas:   @ 02:27  7.37/40/433/23/100.0/-2.0  ABG lactate: --  Arterial Blood Gas:   @ 20:19  7.42/42/165/27/99.7/2.4  ABG lactate: --  Arterial Blood Gas:   @ 17:58  7.20/76/75/30/96.1/-0.4  ABG lactate: --  Arterial Blood Gas:   @ 17:45  7.24/70/162/30/99.2/0.7  ABG lactate: --  Arterial Blood Gas:   @ 17:32  7.41/42/441/27/100.0/1.7  ABG lactate: --  Arterial Blood Gas:   @ 16:40  7.41/43/156/27/99.4/2.3  ABG lactate: --  Arterial Blood Gas:   @ 14:11  7.37/47/155/27/99.5/1.5  ABG lactate: --  Arterial Blood Gas:   @ 05:36  7.39/45/146/27/99.7/1.9  ABG lactate: --  Arterial Blood Gas:   @ 04:11  7.34/52/131/28/99.2/1.6  ABG lactate: --  Arterial Blood Gas:   @ 22:19  7.33/46/140/24/99.7/-1.8  ABG lactate: --  Arterial Blood Gas:   @ 15:49  7.37/42/171/24/99.8/-1.0  ABG lactate: --        MICROBIOLOGY:     Culture - Blood (collected 2022 14:37)  Source: .Blood Blood  Preliminary Report (2022 15:05):    No growth to date.    Culture - Blood (collected 2022 14:37)  Source: .Blood Blood  Preliminary Report (2022 15:05):    No growth to date.        RADIOLOGY:  [ ] Reviewed and interpreted by me    PULMONARY FUNCTION TESTS:    EKG: CHIEF COMPLAINT:  Found down    HPI:  47YO Male PMH recent heavy EtOH use, HTN, possible ASA use, HLD and DM II was taken to [facility] 22 for vomiting since 22 PM unwitnessed fall with resultant altered mental status.     Patient has unwitnessed fall with posterior head bump per [facility] chart notes. CT was performed and shows bilateral frontal and Left temporal contusions. Pt intubated by ED for decreasing GCS and agitation, placed on keppra. Pt had attempted EVD, multiple attempts were made, but unable to be placed successfully so bolt placed for ICP monitoring. Pt ICPs in 50-70s and given 23.4% and 60g mannitol. Placed on propofol sedation. Pt noted to be febrile and placed on Arctic sun. Continued hyperglycemia with acidosis/respiratory compensation, placed on Insulin gtt. Given persistent increased ICPStarted on norepinephrine gtt in attempt to optimize CPP given refractory ICP. Patient had repeat CT performed in unit, showing increased hemorrhage, midline shift and compression of ventricles. Pt was brought to OR for Left Hemicraniectomy for Decompression. Post op CT showed Successful hemicraniectomy and extensive multifocal infarct with poor postop exam. Prognosis discussed with Family and decision to move forward with Organ donation through [facility] was continued. On 22, An Apnea test was performed and the patient was deemed to have undergone brain death at 17:42 due to official reading of ABG 7.24/70/162 from 7.41/42/41.    Pulmonary consulted for bronchoscopy with BAL for Donor lung evaluation and sampling.       PAST MEDICAL & SURGICAL HISTORY:      FAMILY HISTORY:      SOCIAL HISTORY:  Smoking: [ ] Never Smoked [ ] Former Smoker (__ packs x ___ years) [ ] Current Smoker  (__ packs x ___ years)  Substance Use: [ ] Never Used [ ] Used ____  EtOH Use:  Marital Status: [ ] Single [ ]  [ ]  [ ]   Sexual History:   Occupation:  Recent Travel:  Country of Birth:  Advance Directives:    Allergies    No Known Allergies    Intolerances        HOME MEDICATIONS:  Home Medications:  aspirin 81 mg oral tablet, chewable: 1 tab(s) orally once a day (2022 14:52)  atorvastatin 20 mg oral tablet: 1 tab(s) orally once a day (2022 14:52)  lisinopril 2.5 mg oral tablet: 1 tab(s) orally once a day (2022 14:52)  metFORMIN 850 mg oral tablet: 1 tab(s) orally once a day (in the morning) (2022 14:52)      REVIEW OF SYSTEMS:  [x ] Unable to assess ROS because brain death    OBJECTIVE:  ICU Vital Signs Last 24 Hrs  T(C): 35.5 (2022 10:00), Max: 37.2 (2022 02:00)  T(F): 95.9 (2022 10:00), Max: 99 (2022 02:00)  HR: 77 (2022 10:00) (64 - 107)  BP: --  BP(mean): --  ABP: 108/57 (2022 10:00) (108/57 - 168/87)  ABP(mean): 78 (2022 10:00) (75 - 118)  RR: 16 (2022 10:00) (14 - 17)  SpO2: 100% (2022 10:00) (98% - 100%)    Mode: AC/ CMV (Assist Control/ Continuous Mandatory Ventilation), RR (machine): 16, TV (machine): 450, FiO2: 30, PEEP: 5, ITime: 1, MAP: 9, PIP: 20     @ 07: @ 07:00  --------------------------------------------------------  IN: 3064.5 mL / OUT: 1375 mL / NET: 1689.5 mL     @ 07: @ 10:08  --------------------------------------------------------  IN: 101.2 mL / OUT: 140 mL / NET: -38.8 mL      CAPILLARY BLOOD GLUCOSE      POCT Blood Glucose.: 225 mg/dL (2022 05:45)      PHYSICAL EXAM:  General: No response, Brain death   HEENT: No pupillary reflex, staples over prior surgical site  Neck: no JVD   Respiratory: full mechanical ventilatory support - no spontaneous breaths, CTA bilaterally  Cardiovascular: RRR, S1S2  Abdomen: soft, ND  Extremities: no clubbing   Neurological: Brain death protocol completed - no movement or response    LINES:     HOSPITAL MEDICATIONS:  Standing Meds:  albuterol/ipratropium for Nebulization 3 milliLiter(s) Nebulizer every 6 hours  artificial tears (preservative free) Ophthalmic Solution 2 Drop(s) Both EYES every 2 hours  bromocriptine Capsule 5 milliGRAM(s) Oral every 8 hours  chlorhexidine 0.12% Liquid 15 milliLiter(s) Oral Mucosa every 12 hours  chlorhexidine 4% Liquid 1 Application(s) Topical <User Schedule>  dextrose 50% Injectable 25 Gram(s) IV Push once  folic acid 1 milliGRAM(s) Oral daily  insulin lispro (ADMELOG) corrective regimen sliding scale   SubCutaneous every 6 hours  levothyroxine Infusion 10 MICROgram(s)/Hr IV Continuous <Continuous>  methylPREDNISolone sodium succinate IVPB 1000 milliGRAM(s) IV Intermittent daily  multivitamin 1 Tablet(s) Oral daily  mupirocin 2% Nasal 1 Application(s) Both Nostrils every 12 hours  norepinephrine Infusion 0.05 MICROgram(s)/kG/Min IV Continuous <Continuous>  pantoprazole  Injectable 40 milliGRAM(s) IV Push daily  PHENobarbital 64.8 milliGRAM(s) Oral every 12 hours  phenylephrine    Infusion 0.4 MICROgram(s)/kG/Min IV Continuous <Continuous>  piperacillin/tazobactam IVPB.. 3.375 Gram(s) IV Intermittent every 6 hours  thiamine IVPB 500 milliGRAM(s) IV Intermittent daily  vasopressin Infusion 0.04 Unit(s)/Min IV Continuous <Continuous>      PRN Meds:  acetylcysteine 10%  Inhalation 4 milliLiter(s) Inhalation every 4 hours PRN      LABS:                        8.8    8.07  )-----------( 75       ( 2022 08:27 )             26.8     Hgb Trend: 8.8<--, 9.1<--, 9.6<--, 10.7<--, 11.4<--      150<H>  |  114<H>  |  9   ----------------------------<  232<H>  3.2<L>   |  22  |  0.35<L>    Ca    8.2<L>      2022 08:27  Phos  2.7       Mg     1.9         TPro  5.9<L>  /  Alb  2.8<L>  /  TBili  3.8<H>  /  DBili  2.2<H>  /  AST  53<H>  /  ALT  52<H>  /  AlkPhos  237<H>      Creatinine Trend: 0.35<--, 0.38<--, 0.33<--, 0.43<--, 0.43<--, 0.38<--  PT/INR - ( 2022 08:27 )   PT: 14.5 sec;   INR: 1.21 ratio         PTT - ( 2022 08:27 )  PTT:31.8 sec  Urinalysis Basic - ( 2022 08:28 )    Color: Yellow / Appearance: Clear / S.031 / pH: x  Gluc: x / Ketone: Large  / Bili: Small / Urobili: 4 mg/dL   Blood: x / Protein: Trace / Nitrite: Negative   Leuk Esterase: Negative / RBC: x / WBC x   Sq Epi: x / Non Sq Epi: x / Bacteria: x      Arterial Blood Gas:   @ 08:15  7.44/38/410/26/100.0/1.7  ABG lactate: --  Arterial Blood Gas:   @ 02:27  7.37/40/433/23/100.0/-2.0  ABG lactate: --  Arterial Blood Gas:   @ 20:19  7.42/42/165/27/99.7/2.4  ABG lactate: --  Arterial Blood Gas:   @ 17:58  7.20/76/75/30/96.1/-0.4  ABG lactate: --  Arterial Blood Gas:   @ 17:45  7.24/70/162/30/99.2/0.7  ABG lactate: --  Arterial Blood Gas:   @ 17:32  7.41/42/441/27/100.0/1.7  ABG lactate: --  Arterial Blood Gas:   @ 16:40  7.41/43/156/27/99.4/2.3  ABG lactate: --  Arterial Blood Gas:   @ 14:11  7.37/47/155/27/99.5/1.5  ABG lactate: --  Arterial Blood Gas:   @ 05:36  7.39/45/146/27/99.7/1.9  ABG lactate: --  Arterial Blood Gas:   @ 04:11  7.34/52/131/28/99.2/1.6  ABG lactate: --  Arterial Blood Gas:   @ 22:19  7.33/46/140/24/99.7/-1.8  ABG lactate: --  Arterial Blood Gas:   @ 15:49  7.37/42/171/24/99.8/-1.0  ABG lactate: --        MICROBIOLOGY:     Culture - Blood (collected 2022 14:37)  Source: .Blood Blood  Preliminary Report (2022 15:05):    No growth to date.    Culture - Blood (collected 2022 14:37)  Source: .Blood Blood  Preliminary Report (2022 15:05):    No growth to date.        RADIOLOGY:  [ ] Reviewed and interpreted by me    PULMONARY FUNCTION TESTS:    EKG:

## 2022-02-28 NOTE — CONSULT NOTE ADULT - ATTENDING COMMENTS
Pt seen and examined in the ED during trauma activation. Pt with unclear mechanism of injury, transferred from [facility]. Per daughter, with whom patient lives, patient has history of alcoholism. He returned from work and subsequently started acting oddly, asking strange questions and finally leaving the house without getting dressed. In ED at [facility], patient was agitated, intermittently but not consistently making incomprehensible sounds, not opening his eyes to any stimulus, and localizing to painful stimulus (on my exam, E1V2M5). Pt intubated for airway protection. Bilateral breath sounds present, O2 sat 100% on 100% FiO2. Hemodynamically normal (tachycardic immediately after intubation likely due to stimulation, resolved with sedation). GCS 8-9 (per NSG's exam, opened eyes to pain), was moving all extremities, PERRL 4mm and reactive. Full exposure. No traumatic injury identified on secondary exam, although notes from [facility] indicate patient had a posterior scalp hematoma.    CT head repeated, with CT C-spine, C/A/P showed no traumatic injuries except for extensive IPH, SDH, and SAH with midline shift. I discussed patient with [facility], who accepted pt to [facility] ICU.
Patient seen and examined. Patient was critically ill with extensive IPH and worsening ICP requiring emergent decompressive L hemicraniectomy. Unfortunately his course was complicated by a L hemispheric CVA and progressive deterioration of his mental status until he was pronounced brain dead.    His daughter is at bedside and denies any history of pulmonary disease. She has consented for organ donation and Fitz has signed consents which were reviewed. As part of the organ donation process patient requires bronchoscopy to evaluate lungs for possible donation. We will proceed with bronchoscopy, with daughters verbal consent and [facility] signed consents, as requested.    Patient is currently saturating 100% on 100% mechanical ventilation. He does not trigger the vent and does not have any brainstem reflexes. The NSCU team has been updating the patients daughter regularly.

## 2022-03-01 LAB
ALBUMIN SERPL ELPH-MCNC: 2.5 G/DL — LOW (ref 3.3–5)
ALBUMIN SERPL ELPH-MCNC: 2.6 G/DL — LOW (ref 3.3–5)
ALBUMIN SERPL ELPH-MCNC: 2.6 G/DL — LOW (ref 3.3–5)
ALBUMIN SERPL ELPH-MCNC: 2.7 G/DL — LOW (ref 3.3–5)
ALBUMIN SERPL ELPH-MCNC: 2.8 G/DL — LOW (ref 3.3–5)
ALP SERPL-CCNC: 220 U/L — HIGH (ref 40–120)
ALP SERPL-CCNC: 236 U/L — HIGH (ref 40–120)
ALP SERPL-CCNC: 237 U/L — HIGH (ref 40–120)
ALP SERPL-CCNC: 268 U/L — HIGH (ref 40–120)
ALP SERPL-CCNC: 278 U/L — HIGH (ref 40–120)
ALT FLD-CCNC: 41 U/L — SIGNIFICANT CHANGE UP (ref 10–45)
ALT FLD-CCNC: 42 U/L — SIGNIFICANT CHANGE UP (ref 10–45)
ALT FLD-CCNC: 43 U/L — SIGNIFICANT CHANGE UP (ref 10–45)
ALT FLD-CCNC: 44 U/L — SIGNIFICANT CHANGE UP (ref 10–45)
ALT FLD-CCNC: 44 U/L — SIGNIFICANT CHANGE UP (ref 10–45)
AMYLASE P1 CFR SERPL: 25 U/L — SIGNIFICANT CHANGE UP (ref 25–125)
AMYLASE P1 CFR SERPL: 28 U/L — SIGNIFICANT CHANGE UP (ref 25–125)
AMYLASE P1 CFR SERPL: 28 U/L — SIGNIFICANT CHANGE UP (ref 25–125)
AMYLASE P1 CFR SERPL: 33 U/L — SIGNIFICANT CHANGE UP (ref 25–125)
AMYLASE P1 CFR SERPL: 33 U/L — SIGNIFICANT CHANGE UP (ref 25–125)
ANION GAP SERPL CALC-SCNC: 10 MMOL/L — SIGNIFICANT CHANGE UP (ref 5–17)
ANION GAP SERPL CALC-SCNC: 11 MMOL/L — SIGNIFICANT CHANGE UP (ref 5–17)
ANION GAP SERPL CALC-SCNC: 9 MMOL/L — SIGNIFICANT CHANGE UP (ref 5–17)
ANISOCYTOSIS BLD QL: SLIGHT — SIGNIFICANT CHANGE UP
ANISOCYTOSIS BLD QL: SLIGHT — SIGNIFICANT CHANGE UP
APPEARANCE UR: CLEAR — SIGNIFICANT CHANGE UP
APPEARANCE UR: CLEAR — SIGNIFICANT CHANGE UP
APTT BLD: 25.9 SEC — LOW (ref 27.5–35.5)
APTT BLD: 27.5 SEC — SIGNIFICANT CHANGE UP (ref 27.5–35.5)
APTT BLD: 27.9 SEC — SIGNIFICANT CHANGE UP (ref 27.5–35.5)
APTT BLD: 28.8 SEC — SIGNIFICANT CHANGE UP (ref 27.5–35.5)
AST SERPL-CCNC: 40 U/L — SIGNIFICANT CHANGE UP (ref 10–40)
AST SERPL-CCNC: 41 U/L — HIGH (ref 10–40)
AST SERPL-CCNC: 42 U/L — HIGH (ref 10–40)
AST SERPL-CCNC: 43 U/L — HIGH (ref 10–40)
AST SERPL-CCNC: 45 U/L — HIGH (ref 10–40)
BACTERIA # UR AUTO: NEGATIVE — SIGNIFICANT CHANGE UP
BASE EXCESS BLDA CALC-SCNC: -0.9 MMOL/L — SIGNIFICANT CHANGE UP (ref -2–3)
BASE EXCESS BLDA CALC-SCNC: -0.9 MMOL/L — SIGNIFICANT CHANGE UP (ref -2–3)
BASE EXCESS BLDA CALC-SCNC: -1 MMOL/L — SIGNIFICANT CHANGE UP (ref -2–3)
BASE EXCESS BLDA CALC-SCNC: -2 MMOL/L — SIGNIFICANT CHANGE UP (ref -2–3)
BASOPHILS # BLD AUTO: 0 K/UL — SIGNIFICANT CHANGE UP (ref 0–0.2)
BASOPHILS # BLD AUTO: 0.01 K/UL — SIGNIFICANT CHANGE UP (ref 0–0.2)
BASOPHILS NFR BLD AUTO: 0 % — SIGNIFICANT CHANGE UP (ref 0–2)
BASOPHILS NFR BLD AUTO: 0.1 % — SIGNIFICANT CHANGE UP (ref 0–2)
BILIRUB DIRECT SERPL-MCNC: 0.9 MG/DL — HIGH (ref 0–0.3)
BILIRUB DIRECT SERPL-MCNC: 1 MG/DL — HIGH (ref 0–0.3)
BILIRUB INDIRECT FLD-MCNC: 0.7 MG/DL — SIGNIFICANT CHANGE UP (ref 0.2–1)
BILIRUB INDIRECT FLD-MCNC: 0.9 MG/DL — SIGNIFICANT CHANGE UP (ref 0.2–1)
BILIRUB SERPL-MCNC: 1.7 MG/DL — HIGH (ref 0.2–1.2)
BILIRUB SERPL-MCNC: 1.8 MG/DL — HIGH (ref 0.2–1.2)
BILIRUB SERPL-MCNC: 1.9 MG/DL — HIGH (ref 0.2–1.2)
BILIRUB UR-MCNC: ABNORMAL
BILIRUB UR-MCNC: NEGATIVE — SIGNIFICANT CHANGE UP
BUN SERPL-MCNC: 14 MG/DL — SIGNIFICANT CHANGE UP (ref 7–23)
BUN SERPL-MCNC: 15 MG/DL — SIGNIFICANT CHANGE UP (ref 7–23)
BUN SERPL-MCNC: 18 MG/DL — SIGNIFICANT CHANGE UP (ref 7–23)
BUN SERPL-MCNC: 20 MG/DL — SIGNIFICANT CHANGE UP (ref 7–23)
BUN SERPL-MCNC: 20 MG/DL — SIGNIFICANT CHANGE UP (ref 7–23)
CALCIUM SERPL-MCNC: 7.9 MG/DL — LOW (ref 8.4–10.5)
CALCIUM SERPL-MCNC: 8 MG/DL — LOW (ref 8.4–10.5)
CALCIUM SERPL-MCNC: 8.2 MG/DL — LOW (ref 8.4–10.5)
CALCIUM SERPL-MCNC: 8.4 MG/DL — SIGNIFICANT CHANGE UP (ref 8.4–10.5)
CALCIUM SERPL-MCNC: 8.4 MG/DL — SIGNIFICANT CHANGE UP (ref 8.4–10.5)
CHLORIDE SERPL-SCNC: 112 MMOL/L — HIGH (ref 96–108)
CHLORIDE SERPL-SCNC: 114 MMOL/L — HIGH (ref 96–108)
CHLORIDE SERPL-SCNC: 115 MMOL/L — HIGH (ref 96–108)
CHLORIDE SERPL-SCNC: 116 MMOL/L — HIGH (ref 96–108)
CHLORIDE SERPL-SCNC: 117 MMOL/L — HIGH (ref 96–108)
CK MB BLD-MCNC: 1.4 % — SIGNIFICANT CHANGE UP (ref 0–3.5)
CK MB BLD-MCNC: 1.5 % — SIGNIFICANT CHANGE UP (ref 0–3.5)
CK MB BLD-MCNC: 1.5 % — SIGNIFICANT CHANGE UP (ref 0–3.5)
CK MB BLD-MCNC: 1.6 % — SIGNIFICANT CHANGE UP (ref 0–3.5)
CK MB BLD-MCNC: 1.6 % — SIGNIFICANT CHANGE UP (ref 0–3.5)
CK MB CFR SERPL CALC: 3 NG/ML — SIGNIFICANT CHANGE UP (ref 0–6.7)
CK MB CFR SERPL CALC: 3.8 NG/ML — SIGNIFICANT CHANGE UP (ref 0–6.7)
CK MB CFR SERPL CALC: 3.8 NG/ML — SIGNIFICANT CHANGE UP (ref 0–6.7)
CK MB CFR SERPL CALC: 4 NG/ML — SIGNIFICANT CHANGE UP (ref 0–6.7)
CK MB CFR SERPL CALC: 4.3 NG/ML — SIGNIFICANT CHANGE UP (ref 0–6.7)
CK SERPL-CCNC: 208 U/L — HIGH (ref 30–200)
CK SERPL-CCNC: 238 U/L — HIGH (ref 30–200)
CK SERPL-CCNC: 242 U/L — HIGH (ref 30–200)
CK SERPL-CCNC: 270 U/L — HIGH (ref 30–200)
CK SERPL-CCNC: 288 U/L — HIGH (ref 30–200)
CO2 BLDA-SCNC: 21 MMOL/L — SIGNIFICANT CHANGE UP (ref 19–24)
CO2 BLDA-SCNC: 23 MMOL/L — SIGNIFICANT CHANGE UP (ref 19–24)
CO2 SERPL-SCNC: 21 MMOL/L — LOW (ref 22–31)
CO2 SERPL-SCNC: 23 MMOL/L — SIGNIFICANT CHANGE UP (ref 22–31)
CO2 SERPL-SCNC: 23 MMOL/L — SIGNIFICANT CHANGE UP (ref 22–31)
COLOR SPEC: ABNORMAL
COLOR SPEC: YELLOW — SIGNIFICANT CHANGE UP
CREAT SERPL-MCNC: 0.36 MG/DL — LOW (ref 0.5–1.3)
CREAT SERPL-MCNC: 0.4 MG/DL — LOW (ref 0.5–1.3)
CREAT SERPL-MCNC: 0.48 MG/DL — LOW (ref 0.5–1.3)
CREAT SERPL-MCNC: 0.5 MG/DL — SIGNIFICANT CHANGE UP (ref 0.5–1.3)
CREAT SERPL-MCNC: 0.58 MG/DL — SIGNIFICANT CHANGE UP (ref 0.5–1.3)
DIFF PNL FLD: NEGATIVE — SIGNIFICANT CHANGE UP
DIFF PNL FLD: NEGATIVE — SIGNIFICANT CHANGE UP
EGFR: 121 ML/MIN/1.73M2 — SIGNIFICANT CHANGE UP
EGFR: 127 ML/MIN/1.73M2 — SIGNIFICANT CHANGE UP
EGFR: 128 ML/MIN/1.73M2 — SIGNIFICANT CHANGE UP
EGFR: 135 ML/MIN/1.73M2 — SIGNIFICANT CHANGE UP
EGFR: 140 ML/MIN/1.73M2 — SIGNIFICANT CHANGE UP
EOSINOPHIL # BLD AUTO: 0 K/UL — SIGNIFICANT CHANGE UP (ref 0–0.5)
EOSINOPHIL NFR BLD AUTO: 0 % — SIGNIFICANT CHANGE UP (ref 0–6)
EPI CELLS # UR: 3 /HPF — SIGNIFICANT CHANGE UP
FIBRINOGEN PPP-MCNC: 1022 MG/DL — HIGH (ref 330–520)
FIBRINOGEN PPP-MCNC: 1076 MG/DL — HIGH (ref 330–520)
FIBRINOGEN PPP-MCNC: 1096 MG/DL — HIGH (ref 330–520)
FIBRINOGEN PPP-MCNC: 1156 MG/DL — HIGH (ref 330–520)
GAS PNL BLDA: SIGNIFICANT CHANGE UP
GIANT PLATELETS BLD QL SMEAR: PRESENT — SIGNIFICANT CHANGE UP
GLUCOSE BLDC GLUCOMTR-MCNC: 116 MG/DL — HIGH (ref 70–99)
GLUCOSE BLDC GLUCOMTR-MCNC: 120 MG/DL — HIGH (ref 70–99)
GLUCOSE BLDC GLUCOMTR-MCNC: 129 MG/DL — HIGH (ref 70–99)
GLUCOSE BLDC GLUCOMTR-MCNC: 129 MG/DL — HIGH (ref 70–99)
GLUCOSE BLDC GLUCOMTR-MCNC: 131 MG/DL — HIGH (ref 70–99)
GLUCOSE BLDC GLUCOMTR-MCNC: 131 MG/DL — HIGH (ref 70–99)
GLUCOSE BLDC GLUCOMTR-MCNC: 132 MG/DL — HIGH (ref 70–99)
GLUCOSE BLDC GLUCOMTR-MCNC: 137 MG/DL — HIGH (ref 70–99)
GLUCOSE BLDC GLUCOMTR-MCNC: 137 MG/DL — HIGH (ref 70–99)
GLUCOSE BLDC GLUCOMTR-MCNC: 139 MG/DL — HIGH (ref 70–99)
GLUCOSE BLDC GLUCOMTR-MCNC: 141 MG/DL — HIGH (ref 70–99)
GLUCOSE BLDC GLUCOMTR-MCNC: 142 MG/DL — HIGH (ref 70–99)
GLUCOSE BLDC GLUCOMTR-MCNC: 144 MG/DL — HIGH (ref 70–99)
GLUCOSE BLDC GLUCOMTR-MCNC: 145 MG/DL — HIGH (ref 70–99)
GLUCOSE BLDC GLUCOMTR-MCNC: 148 MG/DL — HIGH (ref 70–99)
GLUCOSE BLDC GLUCOMTR-MCNC: 149 MG/DL — HIGH (ref 70–99)
GLUCOSE BLDC GLUCOMTR-MCNC: 150 MG/DL — HIGH (ref 70–99)
GLUCOSE BLDC GLUCOMTR-MCNC: 152 MG/DL — HIGH (ref 70–99)
GLUCOSE BLDC GLUCOMTR-MCNC: 160 MG/DL — HIGH (ref 70–99)
GLUCOSE BLDC GLUCOMTR-MCNC: 162 MG/DL — HIGH (ref 70–99)
GLUCOSE BLDC GLUCOMTR-MCNC: 171 MG/DL — HIGH (ref 70–99)
GLUCOSE BLDC GLUCOMTR-MCNC: 172 MG/DL — HIGH (ref 70–99)
GLUCOSE BLDC GLUCOMTR-MCNC: 174 MG/DL — HIGH (ref 70–99)
GLUCOSE BLDC GLUCOMTR-MCNC: 183 MG/DL — HIGH (ref 70–99)
GLUCOSE SERPL-MCNC: 110 MG/DL — HIGH (ref 70–99)
GLUCOSE SERPL-MCNC: 127 MG/DL — HIGH (ref 70–99)
GLUCOSE SERPL-MCNC: 136 MG/DL — HIGH (ref 70–99)
GLUCOSE SERPL-MCNC: 145 MG/DL — HIGH (ref 70–99)
GLUCOSE SERPL-MCNC: 171 MG/DL — HIGH (ref 70–99)
GLUCOSE UR QL: ABNORMAL
GLUCOSE UR QL: NEGATIVE — SIGNIFICANT CHANGE UP
HCO3 BLDA-SCNC: 20 MMOL/L — LOW (ref 21–28)
HCO3 BLDA-SCNC: 22 MMOL/L — SIGNIFICANT CHANGE UP (ref 21–28)
HCT VFR BLD CALC: 21 % — CRITICAL LOW (ref 39–50)
HCT VFR BLD CALC: 22.3 % — LOW (ref 39–50)
HCT VFR BLD CALC: 22.4 % — LOW (ref 39–50)
HCT VFR BLD CALC: 23 % — LOW (ref 39–50)
HCT VFR BLD CALC: 23.2 % — LOW (ref 39–50)
HGB BLD-MCNC: 7.2 G/DL — LOW (ref 13–17)
HGB BLD-MCNC: 7.5 G/DL — LOW (ref 13–17)
HGB BLD-MCNC: 7.5 G/DL — LOW (ref 13–17)
HGB BLD-MCNC: 7.7 G/DL — LOW (ref 13–17)
HGB BLD-MCNC: 7.9 G/DL — LOW (ref 13–17)
HOROWITZ INDEX BLDA+IHG-RTO: 100 — SIGNIFICANT CHANGE UP
HOROWITZ INDEX BLDA+IHG-RTO: 50 — SIGNIFICANT CHANGE UP
HOROWITZ INDEX BLDA+IHG-RTO: 50 — SIGNIFICANT CHANGE UP
HYALINE CASTS # UR AUTO: 1 /LPF — SIGNIFICANT CHANGE UP (ref 0–2)
IMM GRANULOCYTES NFR BLD AUTO: 0.6 % — SIGNIFICANT CHANGE UP (ref 0–1.5)
INR BLD: 1.07 RATIO — SIGNIFICANT CHANGE UP (ref 0.88–1.16)
INR BLD: 1.09 RATIO — SIGNIFICANT CHANGE UP (ref 0.88–1.16)
INR BLD: 1.09 RATIO — SIGNIFICANT CHANGE UP (ref 0.88–1.16)
INR BLD: 1.12 RATIO — SIGNIFICANT CHANGE UP (ref 0.88–1.16)
KETONES UR-MCNC: ABNORMAL
KETONES UR-MCNC: NEGATIVE — SIGNIFICANT CHANGE UP
LEUKOCYTE ESTERASE UR-ACNC: NEGATIVE — SIGNIFICANT CHANGE UP
LEUKOCYTE ESTERASE UR-ACNC: NEGATIVE — SIGNIFICANT CHANGE UP
LIDOCAIN IGE QN: 6 U/L — LOW (ref 7–60)
LIDOCAIN IGE QN: 7 U/L — SIGNIFICANT CHANGE UP (ref 7–60)
LIDOCAIN IGE QN: 7 U/L — SIGNIFICANT CHANGE UP (ref 7–60)
LYMPHOCYTES # BLD AUTO: 0.34 K/UL — LOW (ref 1–3.3)
LYMPHOCYTES # BLD AUTO: 0.37 K/UL — LOW (ref 1–3.3)
LYMPHOCYTES # BLD AUTO: 0.46 K/UL — LOW (ref 1–3.3)
LYMPHOCYTES # BLD AUTO: 0.55 K/UL — LOW (ref 1–3.3)
LYMPHOCYTES # BLD AUTO: 3.5 % — LOW (ref 13–44)
LYMPHOCYTES # BLD AUTO: 3.5 % — LOW (ref 13–44)
LYMPHOCYTES # BLD AUTO: 4.4 % — LOW (ref 13–44)
LYMPHOCYTES # BLD AUTO: 4.6 % — LOW (ref 13–44)
MACROCYTES BLD QL: SLIGHT — SIGNIFICANT CHANGE UP
MAGNESIUM SERPL-MCNC: 1.9 MG/DL — SIGNIFICANT CHANGE UP (ref 1.6–2.6)
MAGNESIUM SERPL-MCNC: 2 MG/DL — SIGNIFICANT CHANGE UP (ref 1.6–2.6)
MAGNESIUM SERPL-MCNC: 2.1 MG/DL — SIGNIFICANT CHANGE UP (ref 1.6–2.6)
MANUAL SMEAR VERIFICATION: SIGNIFICANT CHANGE UP
MCHC RBC-ENTMCNC: 32 PG — SIGNIFICANT CHANGE UP (ref 27–34)
MCHC RBC-ENTMCNC: 32.1 PG — SIGNIFICANT CHANGE UP (ref 27–34)
MCHC RBC-ENTMCNC: 32.2 PG — SIGNIFICANT CHANGE UP (ref 27–34)
MCHC RBC-ENTMCNC: 32.5 PG — SIGNIFICANT CHANGE UP (ref 27–34)
MCHC RBC-ENTMCNC: 32.6 PG — SIGNIFICANT CHANGE UP (ref 27–34)
MCHC RBC-ENTMCNC: 33.5 GM/DL — SIGNIFICANT CHANGE UP (ref 32–36)
MCHC RBC-ENTMCNC: 33.5 GM/DL — SIGNIFICANT CHANGE UP (ref 32–36)
MCHC RBC-ENTMCNC: 33.6 GM/DL — SIGNIFICANT CHANGE UP (ref 32–36)
MCHC RBC-ENTMCNC: 34.1 GM/DL — SIGNIFICANT CHANGE UP (ref 32–36)
MCHC RBC-ENTMCNC: 34.3 GM/DL — SIGNIFICANT CHANGE UP (ref 32–36)
MCV RBC AUTO: 93.3 FL — SIGNIFICANT CHANGE UP (ref 80–100)
MCV RBC AUTO: 95.7 FL — SIGNIFICANT CHANGE UP (ref 80–100)
MCV RBC AUTO: 95.8 FL — SIGNIFICANT CHANGE UP (ref 80–100)
MCV RBC AUTO: 95.9 FL — SIGNIFICANT CHANGE UP (ref 80–100)
MCV RBC AUTO: 97 FL — SIGNIFICANT CHANGE UP (ref 80–100)
METAMYELOCYTES # FLD: 0.9 % — HIGH (ref 0–0)
MICROCYTES BLD QL: SLIGHT — SIGNIFICANT CHANGE UP
MONOCYTES # BLD AUTO: 0.13 K/UL — SIGNIFICANT CHANGE UP (ref 0–0.9)
MONOCYTES # BLD AUTO: 0.3 K/UL — SIGNIFICANT CHANGE UP (ref 0–0.9)
MONOCYTES # BLD AUTO: 0.41 K/UL — SIGNIFICANT CHANGE UP (ref 0–0.9)
MONOCYTES # BLD AUTO: 0.46 K/UL — SIGNIFICANT CHANGE UP (ref 0–0.9)
MONOCYTES NFR BLD AUTO: 1.7 % — LOW (ref 2–14)
MONOCYTES NFR BLD AUTO: 2.6 % — SIGNIFICANT CHANGE UP (ref 2–14)
MONOCYTES NFR BLD AUTO: 3.5 % — SIGNIFICANT CHANGE UP (ref 2–14)
MONOCYTES NFR BLD AUTO: 3.8 % — SIGNIFICANT CHANGE UP (ref 2–14)
NEUTROPHILS # BLD AUTO: 12.17 K/UL — HIGH (ref 1.8–7.4)
NEUTROPHILS # BLD AUTO: 14.76 K/UL — HIGH (ref 1.8–7.4)
NEUTROPHILS # BLD AUTO: 7.27 K/UL — SIGNIFICANT CHANGE UP (ref 1.8–7.4)
NEUTROPHILS # BLD AUTO: 7.28 K/UL — SIGNIFICANT CHANGE UP (ref 1.8–7.4)
NEUTROPHILS NFR BLD AUTO: 88.6 % — HIGH (ref 43–77)
NEUTROPHILS NFR BLD AUTO: 90.4 % — HIGH (ref 43–77)
NEUTROPHILS NFR BLD AUTO: 90.9 % — HIGH (ref 43–77)
NEUTROPHILS NFR BLD AUTO: 91.3 % — HIGH (ref 43–77)
NEUTS BAND # BLD: 1.7 % — SIGNIFICANT CHANGE UP (ref 0–8)
NEUTS BAND # BLD: 3.5 % — SIGNIFICANT CHANGE UP (ref 0–8)
NEUTS BAND # BLD: 4.4 % — SIGNIFICANT CHANGE UP (ref 0–8)
NIGHT BLUE STAIN TISS: SIGNIFICANT CHANGE UP
NITRITE UR-MCNC: NEGATIVE — SIGNIFICANT CHANGE UP
NITRITE UR-MCNC: NEGATIVE — SIGNIFICANT CHANGE UP
NRBC # BLD: 0 /100 WBCS — SIGNIFICANT CHANGE UP (ref 0–0)
NRBC # BLD: 0 /100 WBCS — SIGNIFICANT CHANGE UP (ref 0–0)
OSMOLALITY SERPL: 299 MOSMOL/KG — SIGNIFICANT CHANGE UP (ref 275–300)
OSMOLALITY SERPL: 301 MOSMOL/KG — HIGH (ref 275–300)
OSMOLALITY SERPL: 308 MOSMOL/KG — HIGH (ref 275–300)
OSMOLALITY SERPL: 309 MOSMOL/KG — HIGH (ref 275–300)
OSMOLALITY SERPL: 309 MOSMOL/KG — HIGH (ref 275–300)
OSMOLALITY UR: 829 MOS/KG — SIGNIFICANT CHANGE UP (ref 300–900)
PCO2 BLDA: 25 MMHG — LOW (ref 35–48)
PCO2 BLDA: 30 MMHG — LOW (ref 35–48)
PCO2 BLDA: 32 MMHG — LOW (ref 35–48)
PCO2 BLDA: 36 MMHG — SIGNIFICANT CHANGE UP (ref 35–48)
PH BLDA: 7.4 — SIGNIFICANT CHANGE UP (ref 7.35–7.45)
PH BLDA: 7.45 — SIGNIFICANT CHANGE UP (ref 7.35–7.45)
PH BLDA: 7.47 — HIGH (ref 7.35–7.45)
PH BLDA: 7.52 — HIGH (ref 7.35–7.45)
PH UR: 6 — SIGNIFICANT CHANGE UP (ref 5–8)
PH UR: 6.5 — SIGNIFICANT CHANGE UP (ref 5–8)
PHOSPHATE SERPL-MCNC: 1.7 MG/DL — LOW (ref 2.5–4.5)
PHOSPHATE SERPL-MCNC: 1.7 MG/DL — LOW (ref 2.5–4.5)
PHOSPHATE SERPL-MCNC: 2.3 MG/DL — LOW (ref 2.5–4.5)
PHOSPHATE SERPL-MCNC: 2.4 MG/DL — LOW (ref 2.5–4.5)
PHOSPHATE SERPL-MCNC: 3.4 MG/DL — SIGNIFICANT CHANGE UP (ref 2.5–4.5)
PLAT MORPH BLD: NORMAL — SIGNIFICANT CHANGE UP
PLATELET # BLD AUTO: 107 K/UL — LOW (ref 150–400)
PLATELET # BLD AUTO: 113 K/UL — LOW (ref 150–400)
PLATELET # BLD AUTO: 67 K/UL — LOW (ref 150–400)
PLATELET # BLD AUTO: 72 K/UL — LOW (ref 150–400)
PLATELET # BLD AUTO: 97 K/UL — LOW (ref 150–400)
PO2 BLDA: 362 MMHG — HIGH (ref 83–108)
PO2 BLDA: 424 MMHG — HIGH (ref 83–108)
PO2 BLDA: 465 MMHG — HIGH (ref 83–108)
PO2 BLDA: 52 MMHG — LOW (ref 83–108)
POLYCHROMASIA BLD QL SMEAR: SLIGHT — SIGNIFICANT CHANGE UP
POTASSIUM SERPL-MCNC: 2.6 MMOL/L — CRITICAL LOW (ref 3.5–5.3)
POTASSIUM SERPL-MCNC: 2.9 MMOL/L — CRITICAL LOW (ref 3.5–5.3)
POTASSIUM SERPL-MCNC: 3.5 MMOL/L — SIGNIFICANT CHANGE UP (ref 3.5–5.3)
POTASSIUM SERPL-MCNC: 4 MMOL/L — SIGNIFICANT CHANGE UP (ref 3.5–5.3)
POTASSIUM SERPL-MCNC: 4.3 MMOL/L — SIGNIFICANT CHANGE UP (ref 3.5–5.3)
POTASSIUM SERPL-SCNC: 2.6 MMOL/L — CRITICAL LOW (ref 3.5–5.3)
POTASSIUM SERPL-SCNC: 2.9 MMOL/L — CRITICAL LOW (ref 3.5–5.3)
POTASSIUM SERPL-SCNC: 3.5 MMOL/L — SIGNIFICANT CHANGE UP (ref 3.5–5.3)
POTASSIUM SERPL-SCNC: 4 MMOL/L — SIGNIFICANT CHANGE UP (ref 3.5–5.3)
POTASSIUM SERPL-SCNC: 4.3 MMOL/L — SIGNIFICANT CHANGE UP (ref 3.5–5.3)
PROT SERPL-MCNC: 5.6 G/DL — LOW (ref 6–8.3)
PROT SERPL-MCNC: 5.6 G/DL — LOW (ref 6–8.3)
PROT SERPL-MCNC: 5.7 G/DL — LOW (ref 6–8.3)
PROT SERPL-MCNC: 5.8 G/DL — LOW (ref 6–8.3)
PROT SERPL-MCNC: 6.1 G/DL — SIGNIFICANT CHANGE UP (ref 6–8.3)
PROT UR-MCNC: ABNORMAL
PROT UR-MCNC: NEGATIVE — SIGNIFICANT CHANGE UP
PROTHROM AB SERPL-ACNC: 12.6 SEC — SIGNIFICANT CHANGE UP (ref 10.5–13.4)
PROTHROM AB SERPL-ACNC: 12.9 SEC — SIGNIFICANT CHANGE UP (ref 10.5–13.4)
PROTHROM AB SERPL-ACNC: 13 SEC — SIGNIFICANT CHANGE UP (ref 10.5–13.4)
PROTHROM AB SERPL-ACNC: 13.1 SEC — SIGNIFICANT CHANGE UP (ref 10.5–13.4)
RBC # BLD: 2.25 M/UL — LOW (ref 4.2–5.8)
RBC # BLD: 2.31 M/UL — LOW (ref 4.2–5.8)
RBC # BLD: 2.33 M/UL — LOW (ref 4.2–5.8)
RBC # BLD: 2.4 M/UL — LOW (ref 4.2–5.8)
RBC # BLD: 2.42 M/UL — LOW (ref 4.2–5.8)
RBC # FLD: 13.7 % — SIGNIFICANT CHANGE UP (ref 10.3–14.5)
RBC # FLD: 13.8 % — SIGNIFICANT CHANGE UP (ref 10.3–14.5)
RBC # FLD: 13.8 % — SIGNIFICANT CHANGE UP (ref 10.3–14.5)
RBC # FLD: 13.9 % — SIGNIFICANT CHANGE UP (ref 10.3–14.5)
RBC # FLD: 13.9 % — SIGNIFICANT CHANGE UP (ref 10.3–14.5)
RBC BLD AUTO: ABNORMAL
RBC BLD AUTO: ABNORMAL
RBC BLD AUTO: SIGNIFICANT CHANGE UP
RBC CASTS # UR COMP ASSIST: 5 /HPF — HIGH (ref 0–4)
SAO2 % BLDA: 100 % — HIGH (ref 94–98)
SAO2 % BLDA: 86.7 % — LOW (ref 94–98)
SAO2 % BLDA: 99.9 % — HIGH (ref 94–98)
SAO2 % BLDA: 99.9 % — HIGH (ref 94–98)
SODIUM SERPL-SCNC: 145 MMOL/L — SIGNIFICANT CHANGE UP (ref 135–145)
SODIUM SERPL-SCNC: 146 MMOL/L — HIGH (ref 135–145)
SODIUM SERPL-SCNC: 146 MMOL/L — HIGH (ref 135–145)
SODIUM SERPL-SCNC: 147 MMOL/L — HIGH (ref 135–145)
SODIUM SERPL-SCNC: 147 MMOL/L — HIGH (ref 135–145)
SP GR SPEC: 1.01 — SIGNIFICANT CHANGE UP (ref 1.01–1.02)
SP GR SPEC: 1.04 — HIGH (ref 1.01–1.02)
SP GR UR STRIP: 1.01 — SIGNIFICANT CHANGE UP (ref 1.01–1.02)
SP GR UR STRIP: 1.04 — HIGH (ref 1.01–1.02)
SPECIMEN SOURCE: SIGNIFICANT CHANGE UP
TROPONIN T, HIGH SENSITIVITY RESULT: 17 NG/L — SIGNIFICANT CHANGE UP (ref 0–51)
TROPONIN T, HIGH SENSITIVITY RESULT: 18 NG/L — SIGNIFICANT CHANGE UP (ref 0–51)
TROPONIN T, HIGH SENSITIVITY RESULT: 21 NG/L — SIGNIFICANT CHANGE UP (ref 0–51)
UROBILINOGEN FLD QL: ABNORMAL
UROBILINOGEN FLD QL: NEGATIVE — SIGNIFICANT CHANGE UP
WBC # BLD: 11.85 K/UL — HIGH (ref 3.8–10.5)
WBC # BLD: 13.09 K/UL — HIGH (ref 3.8–10.5)
WBC # BLD: 15.72 K/UL — HIGH (ref 3.8–10.5)
WBC # BLD: 7.83 K/UL — SIGNIFICANT CHANGE UP (ref 3.8–10.5)
WBC # BLD: 8 K/UL — SIGNIFICANT CHANGE UP (ref 3.8–10.5)
WBC # FLD AUTO: 11.85 K/UL — HIGH (ref 3.8–10.5)
WBC # FLD AUTO: 13.09 K/UL — HIGH (ref 3.8–10.5)
WBC # FLD AUTO: 15.72 K/UL — HIGH (ref 3.8–10.5)
WBC # FLD AUTO: 7.83 K/UL — SIGNIFICANT CHANGE UP (ref 3.8–10.5)
WBC # FLD AUTO: 8 K/UL — SIGNIFICANT CHANGE UP (ref 3.8–10.5)
WBC UR QL: 2 /HPF — SIGNIFICANT CHANGE UP (ref 0–5)

## 2022-03-01 PROCEDURE — 76942 ECHO GUIDE FOR BIOPSY: CPT | Mod: 26

## 2022-03-01 PROCEDURE — 47000 NEEDLE BIOPSY OF LIVER PERQ: CPT

## 2022-03-01 RX ORDER — VASOPRESSIN 20 [USP'U]/ML
0.01 INJECTION INTRAVENOUS
Qty: 50 | Refills: 0 | Status: DISCONTINUED | OUTPATIENT
Start: 2022-03-01 | End: 2022-02-27

## 2022-03-01 RX ORDER — POTASSIUM CHLORIDE 20 MEQ
40 PACKET (EA) ORAL EVERY 4 HOURS
Refills: 0 | Status: COMPLETED | OUTPATIENT
Start: 2022-03-01 | End: 2022-03-01

## 2022-03-01 RX ORDER — POTASSIUM CHLORIDE 20 MEQ
10 PACKET (EA) ORAL
Refills: 0 | Status: COMPLETED | OUTPATIENT
Start: 2022-03-01 | End: 2022-03-02

## 2022-03-01 RX ORDER — FUROSEMIDE 40 MG
40 TABLET ORAL ONCE
Refills: 0 | Status: COMPLETED | OUTPATIENT
Start: 2022-03-01 | End: 2022-03-01

## 2022-03-01 RX ORDER — POTASSIUM CHLORIDE 20 MEQ
10 PACKET (EA) ORAL
Refills: 0 | Status: COMPLETED | OUTPATIENT
Start: 2022-03-01 | End: 2022-03-01

## 2022-03-01 RX ORDER — POTASSIUM PHOSPHATE, MONOBASIC POTASSIUM PHOSPHATE, DIBASIC 236; 224 MG/ML; MG/ML
30 INJECTION, SOLUTION INTRAVENOUS ONCE
Refills: 0 | Status: COMPLETED | OUTPATIENT
Start: 2022-03-01 | End: 2022-03-02

## 2022-03-01 RX ORDER — POTASSIUM PHOSPHATE, MONOBASIC POTASSIUM PHOSPHATE, DIBASIC 236; 224 MG/ML; MG/ML
30 INJECTION, SOLUTION INTRAVENOUS ONCE
Refills: 0 | Status: COMPLETED | OUTPATIENT
Start: 2022-03-01 | End: 2022-03-01

## 2022-03-01 RX ORDER — POTASSIUM CHLORIDE 20 MEQ
20 PACKET (EA) ORAL ONCE
Refills: 0 | Status: COMPLETED | OUTPATIENT
Start: 2022-03-01 | End: 2022-03-01

## 2022-03-01 RX ADMIN — CHLORHEXIDINE GLUCONATE 1 APPLICATION(S): 213 SOLUTION TOPICAL at 22:24

## 2022-03-01 RX ADMIN — Medication 2 DROP(S): at 22:23

## 2022-03-01 RX ADMIN — Medication 3 MILLILITER(S): at 05:02

## 2022-03-01 RX ADMIN — PIPERACILLIN AND TAZOBACTAM 200 GRAM(S): 4; .5 INJECTION, POWDER, LYOPHILIZED, FOR SOLUTION INTRAVENOUS at 13:51

## 2022-03-01 RX ADMIN — Medication 5.89 MICROGRAM(S)/KG/MIN: at 19:35

## 2022-03-01 RX ADMIN — Medication 2 DROP(S): at 04:24

## 2022-03-01 RX ADMIN — Medication 3 MILLILITER(S): at 23:04

## 2022-03-01 RX ADMIN — PIPERACILLIN AND TAZOBACTAM 200 GRAM(S): 4; .5 INJECTION, POWDER, LYOPHILIZED, FOR SOLUTION INTRAVENOUS at 01:52

## 2022-03-01 RX ADMIN — PANTOPRAZOLE SODIUM 40 MILLIGRAM(S): 20 TABLET, DELAYED RELEASE ORAL at 11:57

## 2022-03-01 RX ADMIN — Medication 40 MILLIEQUIVALENT(S): at 13:50

## 2022-03-01 RX ADMIN — Medication 40 MILLIGRAM(S): at 06:41

## 2022-03-01 RX ADMIN — Medication 58 MILLIGRAM(S): at 05:07

## 2022-03-01 RX ADMIN — Medication 50 MICROGRAM(S)/HR: at 07:55

## 2022-03-01 RX ADMIN — Medication 2 DROP(S): at 12:02

## 2022-03-01 RX ADMIN — Medication 3 MILLILITER(S): at 11:48

## 2022-03-01 RX ADMIN — MUPIROCIN 1 APPLICATION(S): 20 OINTMENT TOPICAL at 05:07

## 2022-03-01 RX ADMIN — Medication 250 MILLIGRAM(S): at 05:08

## 2022-03-01 RX ADMIN — CHLORHEXIDINE GLUCONATE 15 MILLILITER(S): 213 SOLUTION TOPICAL at 17:54

## 2022-03-01 RX ADMIN — Medication 40 MILLIEQUIVALENT(S): at 05:40

## 2022-03-01 RX ADMIN — Medication 2 DROP(S): at 07:56

## 2022-03-01 RX ADMIN — Medication 20 MILLIEQUIVALENT(S): at 16:37

## 2022-03-01 RX ADMIN — PIPERACILLIN AND TAZOBACTAM 200 GRAM(S): 4; .5 INJECTION, POWDER, LYOPHILIZED, FOR SOLUTION INTRAVENOUS at 07:56

## 2022-03-01 RX ADMIN — MUPIROCIN 1 APPLICATION(S): 20 OINTMENT TOPICAL at 17:56

## 2022-03-01 RX ADMIN — Medication 2 DROP(S): at 19:41

## 2022-03-01 RX ADMIN — Medication 2 DROP(S): at 01:52

## 2022-03-01 RX ADMIN — Medication 100 MILLIEQUIVALENT(S): at 12:03

## 2022-03-01 RX ADMIN — Medication 2 DROP(S): at 05:07

## 2022-03-01 RX ADMIN — CHLORHEXIDINE GLUCONATE 15 MILLILITER(S): 213 SOLUTION TOPICAL at 05:07

## 2022-03-01 RX ADMIN — Medication 2 DROP(S): at 13:51

## 2022-03-01 RX ADMIN — Medication 2 DROP(S): at 00:16

## 2022-03-01 RX ADMIN — Medication 2 DROP(S): at 16:36

## 2022-03-01 RX ADMIN — Medication 40 MILLIGRAM(S): at 16:36

## 2022-03-01 RX ADMIN — PIPERACILLIN AND TAZOBACTAM 200 GRAM(S): 4; .5 INJECTION, POWDER, LYOPHILIZED, FOR SOLUTION INTRAVENOUS at 19:39

## 2022-03-01 RX ADMIN — Medication 50 MICROGRAM(S)/HR: at 19:35

## 2022-03-01 RX ADMIN — Medication 2 DROP(S): at 17:56

## 2022-03-01 RX ADMIN — Medication 100 MILLIEQUIVALENT(S): at 10:41

## 2022-03-01 RX ADMIN — INSULIN HUMAN 3 UNIT(S)/HR: 100 INJECTION, SOLUTION SUBCUTANEOUS at 19:35

## 2022-03-01 RX ADMIN — Medication 100 MILLIEQUIVALENT(S): at 23:27

## 2022-03-01 RX ADMIN — INSULIN HUMAN 3 UNIT(S)/HR: 100 INJECTION, SOLUTION SUBCUTANEOUS at 07:55

## 2022-03-01 RX ADMIN — VASOPRESSIN 0.6 UNIT(S)/MIN: 20 INJECTION INTRAVENOUS at 19:35

## 2022-03-01 RX ADMIN — Medication 2 DROP(S): at 09:27

## 2022-03-01 RX ADMIN — Medication 250 MILLIGRAM(S): at 17:54

## 2022-03-01 RX ADMIN — Medication 40 MILLIEQUIVALENT(S): at 09:27

## 2022-03-01 RX ADMIN — Medication 100 MILLIEQUIVALENT(S): at 09:38

## 2022-03-01 RX ADMIN — POTASSIUM PHOSPHATE, MONOBASIC POTASSIUM PHOSPHATE, DIBASIC 83.33 MILLIMOLE(S): 236; 224 INJECTION, SOLUTION INTRAVENOUS at 05:07

## 2022-03-01 RX ADMIN — VASOPRESSIN 2.4 UNIT(S)/MIN: 20 INJECTION INTRAVENOUS at 07:55

## 2022-03-01 NOTE — CONSULT NOTE ADULT - SUBJECTIVE AND OBJECTIVE BOX
Interventional Radiology    Evaluate for Procedure: liver biopsy    HPI: Multifocal traumatic contusions, subdural hematomas and subarachnoid hemorrhage w/ refractory ICP crisis s/p emergent L decompressive hemicrani, now with L hemispheric stroke with no plans for further NSG intervention; IR consulted for liver biopsy as per request of organ donation/ [facility].    Allergies: NKDA  Medications (Abx/Cardiac/Anticoagulation/Blood Products)  furosemide   Injectable: 40 milliGRAM(s) IV Push (03-01 @ 06:41)  norepinephrine Infusion: 5.89 mL/Hr IV Continuous (02-28 @ 16:26)  piperacillin/tazobactam IVPB.: 200 mL/Hr IV Intermittent (02-28 @ 02:43)  piperacillin/tazobactam IVPB..: 200 mL/Hr IV Intermittent (03-01 @ 07:56)  vancomycin  IVPB: 250 mL/Hr IV Intermittent (03-01 @ 05:08)    Data:  T(C): 36  HR: 87  BP: --  RR: 12  SpO2: 100%    -WBC 8.00 / HgB 7.9 / Hct 23.2 / Plt 72  -Na 146 / Cl 112 / BUN 15 / Glucose 145  -K -- / CO2 23 / Cr 0.40  -ALT 44 / Alk Phos 237 / T.Bili 1.8  -INR 1.21 / PTT 30.5    Assessment/Plan: Multifocal traumatic contusions, subdural hematomas and subarachnoid hemorrhage w/ refractory ICP crisis s/p emergent L decompressive hemicrani, now with L hemispheric stroke with no plans for further NSG intervention; IR consulted for liver biopsy as per request of organ donation/ [facility].    - case reviewed with IR attending Dr. Begum and approved for today 3/1  - please place IR procedure order  - d/w ICU team

## 2022-03-01 NOTE — PROCEDURE NOTE - NSINFORMCONSENT_GEN_A_CORE
consent for donation/Benefits, risks, and possible complications of procedure explained to patient/caregiver who verbalized understanding and gave written consent.
[facility] Consent/Benefits, risks, and possible complications of procedure explained to patient/caregiver who verbalized understanding and gave written consent.
Benefits, risks, and possible complications of procedure explained to patient/caregiver who verbalized understanding and gave written consent.
Benefits, risks, and possible complications of procedure explained to patient/caregiver who verbalized understanding and gave written consent.
This was an emergent procedure.

## 2022-03-02 VITALS — HEART RATE: 103 BPM | RESPIRATION RATE: 13 BRPM | OXYGEN SATURATION: 100 %

## 2022-03-02 LAB
ALBUMIN SERPL ELPH-MCNC: 2.4 G/DL — LOW (ref 3.3–5)
ALP SERPL-CCNC: 269 U/L — HIGH (ref 40–120)
ALT FLD-CCNC: 38 U/L — SIGNIFICANT CHANGE UP (ref 10–45)
AMYLASE P1 CFR SERPL: 22 U/L — LOW (ref 25–125)
ANION GAP SERPL CALC-SCNC: 11 MMOL/L — SIGNIFICANT CHANGE UP (ref 5–17)
APTT BLD: 26.2 SEC — LOW (ref 27.5–35.5)
AST SERPL-CCNC: 43 U/L — HIGH (ref 10–40)
BASE EXCESS BLDA CALC-SCNC: -2.6 MMOL/L — LOW (ref -2–3)
BILIRUB DIRECT SERPL-MCNC: 1.1 MG/DL — HIGH (ref 0–0.3)
BILIRUB INDIRECT FLD-MCNC: 0.7 MG/DL — SIGNIFICANT CHANGE UP (ref 0.2–1)
BILIRUB SERPL-MCNC: 1.8 MG/DL — HIGH (ref 0.2–1.2)
BUN SERPL-MCNC: 22 MG/DL — SIGNIFICANT CHANGE UP (ref 7–23)
CALCIUM SERPL-MCNC: 7.9 MG/DL — LOW (ref 8.4–10.5)
CHLORIDE SERPL-SCNC: 116 MMOL/L — HIGH (ref 96–108)
CK MB BLD-MCNC: 1.3 % — SIGNIFICANT CHANGE UP (ref 0–3.5)
CK MB CFR SERPL CALC: 2.2 NG/ML — SIGNIFICANT CHANGE UP (ref 0–6.7)
CK SERPL-CCNC: 170 U/L — SIGNIFICANT CHANGE UP (ref 30–200)
CO2 BLDA-SCNC: 20 MMOL/L — SIGNIFICANT CHANGE UP (ref 19–24)
CO2 SERPL-SCNC: 20 MMOL/L — LOW (ref 22–31)
CREAT SERPL-MCNC: 0.59 MG/DL — SIGNIFICANT CHANGE UP (ref 0.5–1.3)
CULTURE RESULTS: SIGNIFICANT CHANGE UP
EGFR: 120 ML/MIN/1.73M2 — SIGNIFICANT CHANGE UP
FIBRINOGEN PPP-MCNC: 946 MG/DL — HIGH (ref 330–520)
GAS PNL BLDA: SIGNIFICANT CHANGE UP
GLUCOSE BLDC GLUCOMTR-MCNC: 255 MG/DL — HIGH (ref 70–99)
GLUCOSE BLDC GLUCOMTR-MCNC: 257 MG/DL — HIGH (ref 70–99)
GLUCOSE SERPL-MCNC: 249 MG/DL — HIGH (ref 70–99)
HCO3 BLDA-SCNC: 19 MMOL/L — LOW (ref 21–28)
HCT VFR BLD CALC: 18.9 % — CRITICAL LOW (ref 39–50)
HGB BLD-MCNC: 6.3 G/DL — CRITICAL LOW (ref 13–17)
HOROWITZ INDEX BLDA+IHG-RTO: 100 — SIGNIFICANT CHANGE UP
INR BLD: 1.18 RATIO — HIGH (ref 0.88–1.16)
LIDOCAIN IGE QN: 6 U/L — LOW (ref 7–60)
MAGNESIUM SERPL-MCNC: 1.9 MG/DL — SIGNIFICANT CHANGE UP (ref 1.6–2.6)
MCHC RBC-ENTMCNC: 31.8 PG — SIGNIFICANT CHANGE UP (ref 27–34)
MCHC RBC-ENTMCNC: 33.3 GM/DL — SIGNIFICANT CHANGE UP (ref 32–36)
MCV RBC AUTO: 95.5 FL — SIGNIFICANT CHANGE UP (ref 80–100)
NRBC # BLD: 0 /100 WBCS — SIGNIFICANT CHANGE UP (ref 0–0)
OSMOLALITY SERPL: 307 MOSMOL/KG — HIGH (ref 275–300)
OSMOLALITY UR: 402 MOS/KG — SIGNIFICANT CHANGE UP (ref 300–900)
PCO2 BLDA: 26 MMHG — LOW (ref 35–48)
PH BLDA: 7.48 — HIGH (ref 7.35–7.45)
PHOSPHATE SERPL-MCNC: 3.5 MG/DL — SIGNIFICANT CHANGE UP (ref 2.5–4.5)
PLATELET # BLD AUTO: 67 K/UL — LOW (ref 150–400)
PO2 BLDA: 398 MMHG — HIGH (ref 83–108)
POTASSIUM SERPL-MCNC: 4 MMOL/L — SIGNIFICANT CHANGE UP (ref 3.5–5.3)
POTASSIUM SERPL-SCNC: 4 MMOL/L — SIGNIFICANT CHANGE UP (ref 3.5–5.3)
PROT SERPL-MCNC: 5.4 G/DL — LOW (ref 6–8.3)
PROTHROM AB SERPL-ACNC: 13.7 SEC — HIGH (ref 10.5–13.4)
RBC # BLD: 1.98 M/UL — LOW (ref 4.2–5.8)
RBC # FLD: 13.8 % — SIGNIFICANT CHANGE UP (ref 10.3–14.5)
SAO2 % BLDA: 100 % — HIGH (ref 94–98)
SODIUM SERPL-SCNC: 147 MMOL/L — HIGH (ref 135–145)
SPECIMEN SOURCE: SIGNIFICANT CHANGE UP
TROPONIN T, HIGH SENSITIVITY RESULT: 18 NG/L — SIGNIFICANT CHANGE UP (ref 0–51)
WBC # BLD: 7.12 K/UL — SIGNIFICANT CHANGE UP (ref 3.8–10.5)
WBC # FLD AUTO: 7.12 K/UL — SIGNIFICANT CHANGE UP (ref 3.8–10.5)

## 2022-03-02 PROCEDURE — 82435 ASSAY OF BLOOD CHLORIDE: CPT

## 2022-03-02 PROCEDURE — 80061 LIPID PANEL: CPT

## 2022-03-02 PROCEDURE — 84132 ASSAY OF SERUM POTASSIUM: CPT

## 2022-03-02 PROCEDURE — 87102 FUNGUS ISOLATION CULTURE: CPT

## 2022-03-02 PROCEDURE — 70498 CT ANGIOGRAPHY NECK: CPT | Mod: MA

## 2022-03-02 PROCEDURE — 86900 BLOOD TYPING SEROLOGIC ABO: CPT

## 2022-03-02 PROCEDURE — P9100: CPT

## 2022-03-02 PROCEDURE — 70496 CT ANGIOGRAPHY HEAD: CPT | Mod: MA

## 2022-03-02 PROCEDURE — P9016: CPT

## 2022-03-02 PROCEDURE — 85014 HEMATOCRIT: CPT

## 2022-03-02 PROCEDURE — 82553 CREATINE MB FRACTION: CPT

## 2022-03-02 PROCEDURE — 87220 TISSUE EXAM FOR FUNGI: CPT

## 2022-03-02 PROCEDURE — 85027 COMPLETE CBC AUTOMATED: CPT

## 2022-03-02 PROCEDURE — 81005 URINALYSIS: CPT

## 2022-03-02 PROCEDURE — 82010 KETONE BODYS QUAN: CPT

## 2022-03-02 PROCEDURE — 81001 URINALYSIS AUTO W/SCOPE: CPT

## 2022-03-02 PROCEDURE — 85025 COMPLETE CBC W/AUTO DIFF WBC: CPT

## 2022-03-02 PROCEDURE — 82565 ASSAY OF CREATININE: CPT

## 2022-03-02 PROCEDURE — 87640 STAPH A DNA AMP PROBE: CPT

## 2022-03-02 PROCEDURE — 70450 CT HEAD/BRAIN W/O DYE: CPT | Mod: MA

## 2022-03-02 PROCEDURE — 72125 CT NECK SPINE W/O DYE: CPT | Mod: MA

## 2022-03-02 PROCEDURE — U0005: CPT

## 2022-03-02 PROCEDURE — 83036 HEMOGLOBIN GLYCOSYLATED A1C: CPT

## 2022-03-02 PROCEDURE — 93005 ELECTROCARDIOGRAM TRACING: CPT

## 2022-03-02 PROCEDURE — 94002 VENT MGMT INPAT INIT DAY: CPT

## 2022-03-02 PROCEDURE — 84480 ASSAY TRIIODOTHYRONINE (T3): CPT

## 2022-03-02 PROCEDURE — 83935 ASSAY OF URINE OSMOLALITY: CPT

## 2022-03-02 PROCEDURE — 74176 CT ABD & PELVIS W/O CONTRAST: CPT

## 2022-03-02 PROCEDURE — 96375 TX/PRO/DX INJ NEW DRUG ADDON: CPT | Mod: XU

## 2022-03-02 PROCEDURE — 87015 SPECIMEN INFECT AGNT CONCNTJ: CPT

## 2022-03-02 PROCEDURE — 83735 ASSAY OF MAGNESIUM: CPT

## 2022-03-02 PROCEDURE — 94640 AIRWAY INHALATION TREATMENT: CPT

## 2022-03-02 PROCEDURE — 94799 UNLISTED PULMONARY SVC/PX: CPT

## 2022-03-02 PROCEDURE — 82330 ASSAY OF CALCIUM: CPT

## 2022-03-02 PROCEDURE — 36430 TRANSFUSION BLD/BLD COMPNT: CPT

## 2022-03-02 PROCEDURE — 96374 THER/PROPH/DIAG INJ IV PUSH: CPT

## 2022-03-02 PROCEDURE — 47000 NEEDLE BIOPSY OF LIVER PERQ: CPT

## 2022-03-02 PROCEDURE — C8929: CPT

## 2022-03-02 PROCEDURE — 99291 CRITICAL CARE FIRST HOUR: CPT

## 2022-03-02 PROCEDURE — 87070 CULTURE OTHR SPECIMN AEROBIC: CPT

## 2022-03-02 PROCEDURE — 71045 X-RAY EXAM CHEST 1 VIEW: CPT

## 2022-03-02 PROCEDURE — 94003 VENT MGMT INPAT SUBQ DAY: CPT

## 2022-03-02 PROCEDURE — 83690 ASSAY OF LIPASE: CPT

## 2022-03-02 PROCEDURE — 86850 RBC ANTIBODY SCREEN: CPT

## 2022-03-02 PROCEDURE — 82550 ASSAY OF CK (CPK): CPT

## 2022-03-02 PROCEDURE — 82962 GLUCOSE BLOOD TEST: CPT

## 2022-03-02 PROCEDURE — 80307 DRUG TEST PRSMV CHEM ANLYZR: CPT

## 2022-03-02 PROCEDURE — 83605 ASSAY OF LACTIC ACID: CPT

## 2022-03-02 PROCEDURE — 86923 COMPATIBILITY TEST ELECTRIC: CPT

## 2022-03-02 PROCEDURE — U0003: CPT

## 2022-03-02 PROCEDURE — 82150 ASSAY OF AMYLASE: CPT

## 2022-03-02 PROCEDURE — 85730 THROMBOPLASTIN TIME PARTIAL: CPT

## 2022-03-02 PROCEDURE — C1889: CPT

## 2022-03-02 PROCEDURE — 87206 SMEAR FLUORESCENT/ACID STAI: CPT

## 2022-03-02 PROCEDURE — 84443 ASSAY THYROID STIM HORMONE: CPT

## 2022-03-02 PROCEDURE — C1713: CPT

## 2022-03-02 PROCEDURE — 82947 ASSAY GLUCOSE BLOOD QUANT: CPT

## 2022-03-02 PROCEDURE — 71260 CT THORAX DX C+: CPT | Mod: MA

## 2022-03-02 PROCEDURE — 85018 HEMOGLOBIN: CPT

## 2022-03-02 PROCEDURE — 80053 COMPREHEN METABOLIC PANEL: CPT

## 2022-03-02 PROCEDURE — 87641 MR-STAPH DNA AMP PROBE: CPT

## 2022-03-02 PROCEDURE — 83930 ASSAY OF BLOOD OSMOLALITY: CPT

## 2022-03-02 PROCEDURE — 82803 BLOOD GASES ANY COMBINATION: CPT

## 2022-03-02 PROCEDURE — 81003 URINALYSIS AUTO W/O SCOPE: CPT

## 2022-03-02 PROCEDURE — 87040 BLOOD CULTURE FOR BACTERIA: CPT

## 2022-03-02 PROCEDURE — 82248 BILIRUBIN DIRECT: CPT

## 2022-03-02 PROCEDURE — 74177 CT ABD & PELVIS W/CONTRAST: CPT | Mod: MA

## 2022-03-02 PROCEDURE — C1729: CPT

## 2022-03-02 PROCEDURE — 93970 EXTREMITY STUDY: CPT

## 2022-03-02 PROCEDURE — 84484 ASSAY OF TROPONIN QUANT: CPT

## 2022-03-02 PROCEDURE — 84100 ASSAY OF PHOSPHORUS: CPT

## 2022-03-02 PROCEDURE — 86901 BLOOD TYPING SEROLOGIC RH(D): CPT

## 2022-03-02 PROCEDURE — P9037: CPT

## 2022-03-02 PROCEDURE — 76700 US EXAM ABDOM COMPLETE: CPT

## 2022-03-02 PROCEDURE — 71250 CT THORAX DX C-: CPT

## 2022-03-02 PROCEDURE — 85610 PROTHROMBIN TIME: CPT

## 2022-03-02 PROCEDURE — 80048 BASIC METABOLIC PNL TOTAL CA: CPT

## 2022-03-02 PROCEDURE — 36415 COLL VENOUS BLD VENIPUNCTURE: CPT

## 2022-03-02 PROCEDURE — 84295 ASSAY OF SERUM SODIUM: CPT

## 2022-03-02 PROCEDURE — 31500 INSERT EMERGENCY AIRWAY: CPT

## 2022-03-02 PROCEDURE — 87116 MYCOBACTERIA CULTURE: CPT

## 2022-03-02 PROCEDURE — 76942 ECHO GUIDE FOR BIOPSY: CPT

## 2022-03-02 PROCEDURE — 85384 FIBRINOGEN ACTIVITY: CPT

## 2022-03-02 PROCEDURE — 80076 HEPATIC FUNCTION PANEL: CPT

## 2022-03-02 PROCEDURE — 84436 ASSAY OF TOTAL THYROXINE: CPT

## 2022-03-02 RX ORDER — ACETAMINOPHEN 500 MG
1000 TABLET ORAL ONCE
Refills: 0 | Status: COMPLETED | OUTPATIENT
Start: 2022-03-02 | End: 2022-03-02

## 2022-03-02 RX ORDER — SODIUM CHLORIDE 9 MG/ML
500 INJECTION INTRAMUSCULAR; INTRAVENOUS; SUBCUTANEOUS ONCE
Refills: 0 | Status: COMPLETED | OUTPATIENT
Start: 2022-03-02 | End: 2022-03-02

## 2022-03-02 RX ADMIN — Medication 1000 MILLIGRAM(S): at 01:35

## 2022-03-02 RX ADMIN — Medication 2 DROP(S): at 02:17

## 2022-03-02 RX ADMIN — Medication 100 MILLIEQUIVALENT(S): at 00:39

## 2022-03-02 RX ADMIN — PIPERACILLIN AND TAZOBACTAM 200 GRAM(S): 4; .5 INJECTION, POWDER, LYOPHILIZED, FOR SOLUTION INTRAVENOUS at 02:16

## 2022-03-02 RX ADMIN — SODIUM CHLORIDE 500 MILLILITER(S): 9 INJECTION INTRAMUSCULAR; INTRAVENOUS; SUBCUTANEOUS at 01:07

## 2022-03-02 RX ADMIN — Medication 100 MILLIEQUIVALENT(S): at 01:38

## 2022-03-02 RX ADMIN — POTASSIUM PHOSPHATE, MONOBASIC POTASSIUM PHOSPHATE, DIBASIC 83.33 MILLIMOLE(S): 236; 224 INJECTION, SOLUTION INTRAVENOUS at 01:05

## 2022-03-02 RX ADMIN — Medication 2 DROP(S): at 00:39

## 2022-03-02 RX ADMIN — Medication 400 MILLIGRAM(S): at 01:05

## 2022-03-04 LAB
CULTURE RESULTS: NO GROWTH — SIGNIFICANT CHANGE UP
SPECIMEN SOURCE: SIGNIFICANT CHANGE UP

## 2022-03-08 RX ORDER — LISINOPRIL 2.5 MG/1
1 TABLET ORAL
Qty: 0 | Refills: 0 | DISCHARGE

## 2022-03-08 RX ORDER — ASPIRIN/CALCIUM CARB/MAGNESIUM 324 MG
1 TABLET ORAL
Qty: 0 | Refills: 0 | DISCHARGE

## 2022-03-08 RX ORDER — ATORVASTATIN CALCIUM 80 MG/1
1 TABLET, FILM COATED ORAL
Qty: 0 | Refills: 0 | DISCHARGE

## 2022-03-08 RX ORDER — METFORMIN HYDROCHLORIDE 850 MG/1
1 TABLET ORAL
Qty: 0 | Refills: 0 | DISCHARGE

## 2022-03-30 LAB
CULTURE RESULTS: SIGNIFICANT CHANGE UP
SPECIMEN SOURCE: SIGNIFICANT CHANGE UP

## 2022-04-20 LAB
CULTURE RESULTS: SIGNIFICANT CHANGE UP
SPECIMEN SOURCE: SIGNIFICANT CHANGE UP

## 2022-10-09 PROBLEM — Z00.00 ENCOUNTER FOR PREVENTIVE HEALTH EXAMINATION: Status: ACTIVE | Noted: 2022-10-09

## 2023-09-07 NOTE — ED PROCEDURE NOTE - CPROC ED NUMBER OF ATTEMPTS1
Last appointment this department: 8/10/2023  Next appointment this department: Visit date not found 1

## (undated) DEVICE — CODMAN PERFORATOR 14MM (BLUE)

## (undated) DEVICE — Device

## (undated) DEVICE — NDL HYPO SAFE 22G X 1.5" (BLACK)

## (undated) DEVICE — MIDAS REX MR8 TAPERED SM BORE 2.3MM X 7CM FOOTED

## (undated) DEVICE — PACK MAJOR ABDOMINAL SUPINE

## (undated) DEVICE — MARKING PEN W RULER

## (undated) DEVICE — DRSG OPSITE 13.75 X 4"

## (undated) DEVICE — DRAPE SPLIT SHEET 77" X 108"

## (undated) DEVICE — ELCTR SUBDERMAL CORKSCREW NDL 1.2MM

## (undated) DEVICE — DRAPE INSTRUMENT POUCH 6.75" X 11"

## (undated) DEVICE — FOLEY TRAY 16FR LF URINE METER SURESTEP

## (undated) DEVICE — DRAPE MAYO STAND 30"

## (undated) DEVICE — GLV 7.5 PROTEXIS (WHITE)

## (undated) DEVICE — DRAPE SURGICAL #1010

## (undated) DEVICE — SUCTION YANKAUER NO CONTROL VENT

## (undated) DEVICE — PACK POST MORTEM ADULT

## (undated) DEVICE — GOWN LG

## (undated) DEVICE — TUBING TUR 2 PRONG

## (undated) DEVICE — MEDICATION LABELS W MARKER

## (undated) DEVICE — DRAIN JACKSON PRATT 3 SPRING RESERVOIR W 10FR PVC DRAIN

## (undated) DEVICE — ELCTR 4-DISC 20MM 49" (RED, BLUE, GREEN, BLACK)

## (undated) DEVICE — ELCTR SUBDERMAL NDL 27G X 1/2" WITH TWISTED PAIR

## (undated) DEVICE — SYR ASEPTO

## (undated) DEVICE — SUT NUROLON 4-0 8-18" TF (POP-OFF)

## (undated) DEVICE — SUT VICRYL 3-0 18" CP-2 UNDYED (POP-OFF)

## (undated) DEVICE — ELCTR BOVIE TIP BLADE INSULATED 2.75" EDGE

## (undated) DEVICE — SUT SOFSILK 0 30" TIES

## (undated) DEVICE — DRAPE TOWEL BLUE 17" X 24"

## (undated) DEVICE — SAW BLADE MICROAIRE STERNUM 1X34X9.4MM

## (undated) DEVICE — MIDAS REX MR7 LUBRICANT DIFFUSER CARTRIDGE

## (undated) DEVICE — DRAPE ISOLATION BAG 20X20"

## (undated) DEVICE — SUCTION YANKAUER OPEN TIP NO VENT CURVE

## (undated) DEVICE — GLV 8 PROTEXIS (WHITE)

## (undated) DEVICE — DRSG XEROFORM 1 X 8"

## (undated) DEVICE — TAPE UMBILICAL 1/8 X 30" STRANDS

## (undated) DEVICE — GOWN TRIMAX LG

## (undated) DEVICE — DRAPE 1/2 SHEET 40X57"

## (undated) DEVICE — WARMING BLANKET LOWER ADULT

## (undated) DEVICE — DRSG TELFA .25 X 3

## (undated) DEVICE — DRAPE SLUSH / WARMER 44 X 66"

## (undated) DEVICE — POSITIONER FOAM HEADREST (PINK)

## (undated) DEVICE — SUT ETHILON 3-0 18" FS-1

## (undated) DEVICE — SUT SOFSILK 4-0 18" CV-23

## (undated) DEVICE — WARMING BLANKET UPPER ADULT

## (undated) DEVICE — ELCTR HEX BLADE

## (undated) DEVICE — GLV 8.5 PROTEXIS (WHITE)

## (undated) DEVICE — SOL IRR POUR H2O 250ML

## (undated) DEVICE — ELCTR BOVIE PENCIL HANDPIECE

## (undated) DEVICE — SUT SOFSILK 4-0 30" TIES

## (undated) DEVICE — SUT VICRYL 2-0 18" CP-2 UNDYED (POP-OFF)

## (undated) DEVICE — TUBING BIPOLAR IRRIGATOR AND CORD SET

## (undated) DEVICE — HANDSET ARGON

## (undated) DEVICE — GLV 6.5 PROTEXIS (WHITE)

## (undated) DEVICE — PREP CHLORAPREP HI-LITE ORANGE 26ML

## (undated) DEVICE — AESCULAP SCALPFIX 10 CLIPS

## (undated) DEVICE — SOL IRR POUR NS 0.9% 500ML

## (undated) DEVICE — SUT SOFSILK 3-0 30" TIES

## (undated) DEVICE — SPECIMEN CONTAINER 100ML

## (undated) DEVICE — LAP PAD 18 X 18"

## (undated) DEVICE — SUT SOFSILK 0 30" V-20

## (undated) DEVICE — SUT PDS II 1 54" TP-1

## (undated) DEVICE — RUBBER BANDS STERILE

## (undated) DEVICE — DRSG CURITY GAUZE SPONGE 4 X 4" 12-PLY

## (undated) DEVICE — DRAPE CAMERA VIDEO 7"X96"

## (undated) DEVICE — VENODYNE/SCD SLEEVE CALF LARGE

## (undated) DEVICE — GLV 7 PROTEXIS (WHITE)

## (undated) DEVICE — POSITIONER FOAM EGG CRATE ULNAR 2PCS (PINK)

## (undated) DEVICE — SUT SOFSILK 2-0 30" TIES

## (undated) DEVICE — BLADE SCALPEL SAFETYLOCK #10

## (undated) DEVICE — TUBING SUCTION 20FT

## (undated) DEVICE — DRSG TELFA .5 X 3

## (undated) DEVICE — DRAPE 3/4 SHEET W REINFORCEMENT 56X77"

## (undated) DEVICE — WARMING BLANKET FULL ADULT

## (undated) DEVICE — STAPLER SKIN VISI-STAT 35 WIDE

## (undated) DEVICE — NDL BIOPSY TRU-CUT 14G X 6"

## (undated) DEVICE — DRAPE LIGHT HANDLE COVER (BLUE)

## (undated) DEVICE — ELCTR SUBDERMAL NDL CLASSIC 1.5M X 59" (6 COLOR)

## (undated) DEVICE — BLADE SCALPEL SAFETYLOCK #15

## (undated) DEVICE — DRSG MASTISOL

## (undated) DEVICE — PREP BETADINE KIT